# Patient Record
Sex: FEMALE | Race: WHITE | Employment: FULL TIME | URBAN - METROPOLITAN AREA
[De-identification: names, ages, dates, MRNs, and addresses within clinical notes are randomized per-mention and may not be internally consistent; named-entity substitution may affect disease eponyms.]

---

## 2018-03-11 ENCOUNTER — APPOINTMENT (OUTPATIENT)
Dept: GENERAL RADIOLOGY | Age: 28
End: 2018-03-11
Payer: COMMERCIAL

## 2018-03-11 ENCOUNTER — HOSPITAL ENCOUNTER (EMERGENCY)
Age: 28
Discharge: HOME | End: 2018-03-11
Attending: EMERGENCY MEDICINE
Payer: COMMERCIAL

## 2018-03-11 VITALS
HEART RATE: 88 BPM | OXYGEN SATURATION: 99 % | WEIGHT: 243.39 LBS | SYSTOLIC BLOOD PRESSURE: 134 MMHG | BODY MASS INDEX: 43.12 KG/M2 | DIASTOLIC BLOOD PRESSURE: 80 MMHG | TEMPERATURE: 98.8 F | RESPIRATION RATE: 16 BRPM | HEIGHT: 63 IN
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
## 2018-03-11 DIAGNOSIS — S46.912A STRAIN OF LEFT SHOULDER, INITIAL ENCOUNTER: Primary | ICD-10-CM

## 2018-03-11 PROCEDURE — 99283 EMERGENCY DEPT VISIT LOW MDM: CPT

## 2018-03-11 PROCEDURE — 73030 X-RAY EXAM OF SHOULDER: CPT

## 2018-03-11 PROCEDURE — A4565 SLINGS: HCPCS

## 2018-03-11 PROCEDURE — 74011250637 HC RX REV CODE- 250/637: Performed by: PHYSICIAN ASSISTANT

## 2018-03-11 RX ORDER — IBUPROFEN 600 MG/1
600 TABLET ORAL
Status: COMPLETED | OUTPATIENT
Start: 2018-03-11 | End: 2018-03-11

## 2018-03-11 RX ORDER — LORATADINE 10 MG/1
10 TABLET ORAL
COMMUNITY
End: 2021-10-29

## 2018-03-11 RX ORDER — IBUPROFEN 600 MG/1
600 TABLET ORAL
Qty: 20 TAB | Refills: 0 | Status: SHIPPED | OUTPATIENT
Start: 2018-03-11 | End: 2018-03-21

## 2018-03-11 RX ORDER — BUDESONIDE AND FORMOTEROL FUMARATE DIHYDRATE 160; 4.5 UG/1; UG/1
2 AEROSOL RESPIRATORY (INHALATION) 2 TIMES DAILY
COMMUNITY
End: 2021-10-29

## 2018-03-11 RX ADMIN — IBUPROFEN 600 MG: 600 TABLET, FILM COATED ORAL at 17:25

## 2018-03-11 NOTE — LETTER
[address] 
\[facility]\"" EMERGENCY DEPT 
[address] [place] [address]. [address] [id number] [phone] Work/School Note Date: 3/11/2018 To Whom It May concern: 
 
Santiago Ortiz was seen and treated today in the emergency room by the following provider(s): 
Attending Provider: Opal Moreira DO Physician Assistant: ROBERT Zuluaga. Please excuse Santiago Ortiz from work tomorrow. Sincerely, Jose Roberto Bernal, [address]

## 2018-03-11 NOTE — DISCHARGE INSTRUCTIONS
Shoulder Pain: Care Instructions  Your Care Instructions    You can hurt your shoulder by using it too much during an activity, such as fishing or baseball. It can also happen as part of the everyday wear and tear of getting older. Shoulder injuries can be slow to heal, but your shoulder should get better with time. Your doctor may recommend a sling to rest your shoulder. If you have injured your shoulder, you may need testing and treatment. Follow-up care is a key part of your treatment and safety. Be sure to make and go to all appointments, and call your doctor if you are having problems. It's also a good idea to know your test results and keep a list of the medicines you take. How can you care for yourself at home? · Take pain medicines exactly as directed. ¨ If the doctor gave you a prescription medicine for pain, take it as prescribed. ¨ If you are not taking a prescription pain medicine, ask your doctor if you can take an over-the-counter medicine. ¨ Do not take two or more pain medicines at the same time unless the doctor told you to. Many pain medicines contain acetaminophen, which is Tylenol. Too much acetaminophen (Tylenol) can be harmful. · If your doctor recommends that you wear a sling, use it as directed. Do not take it off before your doctor tells you to. · Put ice or a cold pack on the sore area for 10 to 20 minutes at a time. Put a thin cloth between the ice and your skin. · If there is no swelling, you can put moist heat, a heating pad, or a warm cloth on your shoulder. Some doctors suggest alternating between hot and cold. · Rest your shoulder for a few days. If your doctor recommends it, you can then begin gentle exercise of the shoulder, but do not lift anything heavy. When should you call for help? Call 911 anytime you think you may need emergency care. For example, call if:  ? · You have chest pain or pressure. This may occur with:  ¨ Sweating.   ¨ Shortness of breath. ¨ Nausea or vomiting. ¨ Pain that spreads from the chest to the neck, jaw, or one or both shoulders or arms. ¨ Dizziness or lightheadedness. ¨ A fast or uneven pulse. After calling 911, chew 1 adult-strength aspirin. Wait for an ambulance. Do not try to drive yourself. ? · Your arm or hand is cool or pale or changes color. ?Call your doctor now or seek immediate medical care if:  ? · You have signs of infection, such as:  ¨ Increased pain, swelling, warmth, or redness in your shoulder. ¨ Red streaks leading from a place on your shoulder. ¨ Pus draining from an area of your shoulder. ¨ Swollen lymph nodes in your neck, armpits, or groin. ¨ A fever. ? Watch closely for changes in your health, and be sure to contact your doctor if:  ? · You cannot use your shoulder. ? · Your shoulder does not get better as expected. Where can you learn more? Go to http://ha-brent.info/. Enter U992 in the search box to learn more about \"Shoulder Pain: Care Instructions. \"  Current as of: March 21, 2017  Content Version: 11.4  © 4003-7364 [employer]. Care instructions adapted under license by [employer] (which disclaims liability or warranty for this information). If you have questions about a medical condition or this instruction, always ask your healthcare professional. [place] [address] any warranty or liability for your use of this information.

## 2018-03-11 NOTE — ED PROVIDER NOTES
EMERGENCY DEPARTMENT HISTORY AND PHYSICAL EXAM      Date: 3/11/2018  Patient Name: Eric Salazar    History of Presenting Illness     Chief Complaint   Patient presents with    Fall     patient reports tripping and falling down a few stairs    Shoulder Injury     left shoulder pain, radiating down toward elbow       History Provided By: Patient    HPI: Eric Salazar, 33 y.o. female with PMHx significant for asthma, presents ambulatory to the ED with cc of an acute onset of pulling, left shoulder pain radiating down the LUE s/p fall 1 hour PTA. The pt reports associated sx of head trauma at the time of the fall as well. She expresses that she was going down the stairs when she lost her balance causing her to fall down 10 steps falling on the left side of her body noting that she mildly hit her head as well. The pt discloses that her discomfort is exacerbated with any movement of the LUE and denies taking any medications PTA. She ensures that she is right hand dominant and is UTD on all immunizations. The pt denies any h/o left shoulder complications and denies being followed by an orthopedic doctor. She denies any fevers, chills, chest pain, SOB, abdominal pain, LOC or seizure activity. PCP: Eden Vallecillo., MD    There are no other complaints, changes, or physical findings at this time. Current Outpatient Prescriptions   Medication Sig Dispense Refill    cetirizine (ZYRTEC) 10 mg tablet Take 10 mg by mouth daily.  multivitamin (ONE A DAY) tablet Take 1 tablet by mouth daily.  OTHER Birth control pill cannot recall name         Past History     Past Medical History:  Past Medical History:   Diagnosis Date    Asthma        Past Surgical History:  No past surgical history on file. Family History:  No family history on file.     Social History:  Social History   Substance Use Topics    Smoking status: Never Smoker    Smokeless tobacco: Not on file    Alcohol use No Allergies:  No Known Allergies      Review of Systems   Review of Systems   Constitutional: Negative. Negative for chills and fever. HENT: Negative. Negative for rhinorrhea and sore throat. Eyes: Negative. Negative for visual disturbance. Respiratory: Negative. Negative for cough, chest tightness, shortness of breath and wheezing. Cardiovascular: Negative. Negative for chest pain and palpitations. Gastrointestinal: Negative. Negative for abdominal pain, constipation, diarrhea, nausea and vomiting. Genitourinary: Negative. Negative for dysuria and hematuria. Musculoskeletal: Positive for arthralgias (left shoulder and LUE ). Negative for myalgias. Skin: Negative. Negative for rash. Allergic/Immunologic: Negative. Negative for environmental allergies and food allergies. Neurological: Negative. Negative for seizures and headaches. (+) head trauma   (-) LOC   Psychiatric/Behavioral: Negative. Negative for suicidal ideas. All other systems reviewed and are negative. Physical Exam   Physical Exam   Constitutional: She is oriented to person, place, and time. She appears well-developed and well-nourished. No distress. Pleasant, overweight 34 yo  female   HENT:   Head: Normocephalic and atraumatic. Eyes: Conjunctivae are normal. Right eye exhibits no discharge. Left eye exhibits no discharge. Neck: Normal range of motion. Neck supple. Cardiovascular: Normal rate, regular rhythm, normal heart sounds and intact distal pulses. No murmur heard. Pulmonary/Chest: Effort normal and breath sounds normal. No respiratory distress. She has no wheezes. Musculoskeletal:   L SHOULDER: No swelling, ecchymosis or deformity. Slight TTP to the anterior shoulder; otherwise NT to palpation. ROM of the shoulder limited with pain. Distal NV intact. Cap refill < 2 sec. Ambulatory. Neurological: She is alert and oriented to person, place, and time.    Skin: Skin is warm and dry. She is not diaphoretic. Psychiatric: She has a normal mood and affect. Her behavior is normal.   Nursing note and vitals reviewed. Diagnostic Study Results     Radiologic Studies -   XR SHOULDER LT AP/LAT MIN 2 V   Final Result   EXAM:  XR SHOULDER LT AP/LAT MIN 2 V     INDICATION:   Trauma.     COMPARISON: None.     FINDINGS: Three views of the left shoulder demonstrate no fracture, dislocation  or other acute abnormality.     IMPRESSION  IMPRESSION:  No acute abnormality. Medical Decision Making   I am the first provider for this patient. I reviewed the vital signs, available nursing notes, past medical history, past surgical history, family history and social history. Vital Signs-Reviewed the patient's vital signs. Patient Vitals for the past 12 hrs:   Temp Pulse Resp BP SpO2   03/11/18 1704 98.8 °F (37.1 °C) 88 16 134/80 99 %       Pulse Oximetry Analysis - 99% on room air    Cardiac Monitor:   Rate: 88 bpm  Rhythm: Normal Sinus Rhythm        Records Reviewed: Nursing Notes, Old Medical Records, Previous Radiology Studies and Previous Laboratory Studies    Provider Notes (Medical Decision Making):     DDx: Strain, Sprain, Fracture, Rotator cuff injury. ED Course:   Initial assessment performed. The patients presenting problems have been discussed, and they are in agreement with the care plan formulated and outlined with them. I have encouraged them to ask questions as they arise throughout their visit. Progress Notes:    5:57 PM  The pt has been re-evaluated. She was updated on reassuring XR results. She was updated on the plan for discharge with sling placement and orthopedic follow up if needed. Procedure Note - Immobilizer Placement:  5:58 PM  Performed by: Willie Stewart   Neurovascularly intact prior to tx. A shoulder immobilizer was placed on pt's left shoulder. Joint was placed in neutral position. Neurovascularly intact after tx.    The procedure took 1-15 minutes, and pt tolerated well. Written by Arnulfo Subramanian ED Scribe, as dictated by RONI [id number] [facility] Time: 0 minutes    Disposition:  Discharge Note:  6:01 PM  The patient is ready for discharge. The patient's signs, symptoms, diagnosis, and discharge instruction have been discussed and the patient has conveyed their understanding. The patient is to follow up as recommended or return to the ER should their symptoms worsen. Plan has been discussed and the patient is in agreement. Written by Koki Mcdowell ED Scribe, as dictated by RONI Avendano     PLAN:  1. Current Discharge Medication List      START taking these medications    Details   ibuprofen (MOTRIN) 600 mg tablet Take 1 Tab by mouth every eight (8) hours as needed for Pain for up to 10 days. Qty: 20 Tab, Refills: 0         CONTINUE these medications which have NOT CHANGED    Details   loratadine (CLARITIN) 10 mg tablet Take 10 mg by mouth.      budesonide-formoterol (SYMBICORT) 160-4.5 mcg/actuation HFAA Take 2 Puffs by inhalation two (2) times a day. OTHER Birth control pill cannot recall name      multivitamin (ONE A DAY) tablet Take 1 tablet by mouth daily. 2.   Follow-up Information     Follow up With Details Comments Ingrid Heard MD In 1 week As needed [address]  [phone]        3. Use sling as discussed  Return to ED if worse     Diagnosis     Clinical Impression:   1. Strain of left shoulder, initial encounter    2. Fall, initial encounter        Attestations:    Attestation: This note is prepared by Marta Mcdowell, acting as Scribe for [employer] .      RONI Rodriguez : The scribe's documentation has been prepared under my direction and personally reviewed by me in its entirety.  I confirm that the note above accurately reflects all work, treatment, procedures, and medical decision making performed by me.

## 2018-03-11 NOTE — ED NOTES
Galen JONES has reviewed discharge instructions with the patient. The patient verbalized understanding. Pt discharged with written instructions. No further concerns at this time.

## 2021-05-04 LAB
ANTIBODY SCREEN, EXTERNAL: NORMAL
CHLAMYDIA, EXTERNAL: NORMAL
HBSAG, EXTERNAL: NORMAL
HIV, EXTERNAL: NON REACTIVE
N. GONORRHEA, EXTERNAL: NORMAL
RUBELLA, EXTERNAL: NORMAL
T. PALLIDUM, EXTERNAL: NON REACTIVE

## 2021-10-25 ENCOUNTER — HOSPITAL ENCOUNTER (INPATIENT)
Age: 31
LOS: 4 days | Discharge: HOME | End: 2021-10-29
Attending: OBSTETRICS & GYNECOLOGY | Admitting: OBSTETRICS & GYNECOLOGY
Payer: COMMERCIAL

## 2021-10-25 PROBLEM — O42.90 PROM (PREMATURE RUPTURE OF MEMBRANES): Status: ACTIVE | Noted: 2021-10-25

## 2021-10-25 LAB
A1 MICROGLOB PLACENTAL VAG QL: POSITIVE
ABO + RH BLD: NORMAL
ALBUMIN SERPL-MCNC: 2.5 G/DL (ref 3.5–5)
ALBUMIN SERPL-MCNC: 2.7 G/DL (ref 3.5–5)
ALBUMIN/GLOB SERPL: 0.6 {RATIO} (ref 1.1–2.2)
ALBUMIN/GLOB SERPL: 0.7 {RATIO} (ref 1.1–2.2)
ALP SERPL-CCNC: 174 U/L (ref 45–117)
ALP SERPL-CCNC: 174 U/L (ref 45–117)
ALT SERPL-CCNC: 104 U/L (ref 12–78)
ALT SERPL-CCNC: 94 U/L (ref 12–78)
AMPHET UR QL SCN: NEGATIVE
ANION GAP SERPL CALC-SCNC: 10 MMOL/L (ref 5–15)
ANION GAP SERPL CALC-SCNC: 9 MMOL/L (ref 5–15)
AST SERPL-CCNC: 56 U/L (ref 15–37)
AST SERPL-CCNC: 64 U/L (ref 15–37)
BARBITURATES UR QL SCN: NEGATIVE
BASOPHILS # BLD: 0 K/UL (ref 0–0.1)
BASOPHILS # BLD: 0 K/UL (ref 0–0.1)
BASOPHILS NFR BLD: 0 % (ref 0–1)
BASOPHILS NFR BLD: 0 % (ref 0–1)
BENZODIAZ UR QL: NEGATIVE
BILIRUB SERPL-MCNC: 0.3 MG/DL (ref 0.2–1)
BILIRUB SERPL-MCNC: 0.3 MG/DL (ref 0.2–1)
BLOOD GROUP ANTIBODIES SERPL: NORMAL
BUN SERPL-MCNC: 4 MG/DL (ref 6–20)
BUN SERPL-MCNC: 5 MG/DL (ref 6–20)
BUN/CREAT SERPL: 11 (ref 12–20)
BUN/CREAT SERPL: 6 (ref 12–20)
CALCIUM SERPL-MCNC: 9.2 MG/DL (ref 8.5–10.1)
CALCIUM SERPL-MCNC: 9.2 MG/DL (ref 8.5–10.1)
CANNABINOIDS UR QL SCN: POSITIVE
CHLORIDE SERPL-SCNC: 104 MMOL/L (ref 97–108)
CHLORIDE SERPL-SCNC: 106 MMOL/L (ref 97–108)
CO2 SERPL-SCNC: 19 MMOL/L (ref 21–32)
CO2 SERPL-SCNC: 20 MMOL/L (ref 21–32)
COCAINE UR QL SCN: NEGATIVE
CONTROL LINE PRESENT?: NORMAL
COVID-19 RAPID TEST, COVR: NOT DETECTED
CREAT SERPL-MCNC: 0.47 MG/DL (ref 0.55–1.02)
CREAT SERPL-MCNC: 0.65 MG/DL (ref 0.55–1.02)
CREAT UR-MCNC: 123 MG/DL
DIFFERENTIAL METHOD BLD: ABNORMAL
DIFFERENTIAL METHOD BLD: ABNORMAL
DRUG SCRN COMMENT,DRGCM: ABNORMAL
EOSINOPHIL # BLD: 0 K/UL (ref 0–0.4)
EOSINOPHIL # BLD: 0.2 K/UL (ref 0–0.4)
EOSINOPHIL NFR BLD: 0 % (ref 0–7)
EOSINOPHIL NFR BLD: 3 % (ref 0–7)
ERYTHROCYTE [DISTWIDTH] IN BLOOD BY AUTOMATED COUNT: 14.4 % (ref 11.5–14.5)
ERYTHROCYTE [DISTWIDTH] IN BLOOD BY AUTOMATED COUNT: 14.4 % (ref 11.5–14.5)
EXPIRATION DATE: NORMAL
GLOBULIN SER CALC-MCNC: 4 G/DL (ref 2–4)
GLOBULIN SER CALC-MCNC: 4.1 G/DL (ref 2–4)
GLUCOSE BLD STRIP.AUTO-MCNC: 122 MG/DL (ref 65–117)
GLUCOSE BLD STRIP.AUTO-MCNC: 122 MG/DL (ref 65–117)
GLUCOSE BLD STRIP.AUTO-MCNC: 154 MG/DL (ref 65–117)
GLUCOSE SERPL-MCNC: 158 MG/DL (ref 65–100)
GLUCOSE SERPL-MCNC: 87 MG/DL (ref 65–100)
HCT VFR BLD AUTO: 33.4 % (ref 35–47)
HCT VFR BLD AUTO: 33.8 % (ref 35–47)
HGB BLD-MCNC: 11.3 G/DL (ref 11.5–16)
HGB BLD-MCNC: 11.6 G/DL (ref 11.5–16)
IMM GRANULOCYTES # BLD AUTO: 0 K/UL (ref 0–0.04)
IMM GRANULOCYTES # BLD AUTO: 0.1 K/UL (ref 0–0.04)
IMM GRANULOCYTES NFR BLD AUTO: 1 % (ref 0–0.5)
IMM GRANULOCYTES NFR BLD AUTO: 1 % (ref 0–0.5)
INTERNAL NEGATIVE CONTROL: NORMAL
KIT LOT NO.: NORMAL
LDH SERPL L TO P-CCNC: 223 U/L (ref 81–246)
LYMPHOCYTES # BLD: 1.3 K/UL (ref 0.8–3.5)
LYMPHOCYTES # BLD: 1.8 K/UL (ref 0.8–3.5)
LYMPHOCYTES NFR BLD: 11 % (ref 12–49)
LYMPHOCYTES NFR BLD: 20 % (ref 12–49)
MCH RBC QN AUTO: 29.6 PG (ref 26–34)
MCH RBC QN AUTO: 30.3 PG (ref 26–34)
MCHC RBC AUTO-ENTMCNC: 33.8 G/DL (ref 30–36.5)
MCHC RBC AUTO-ENTMCNC: 34.3 G/DL (ref 30–36.5)
MCV RBC AUTO: 87.4 FL (ref 80–99)
MCV RBC AUTO: 88.3 FL (ref 80–99)
METHADONE UR QL: NEGATIVE
MONOCYTES # BLD: 0.2 K/UL (ref 0–1)
MONOCYTES # BLD: 0.5 K/UL (ref 0–1)
MONOCYTES NFR BLD: 2 % (ref 5–13)
MONOCYTES NFR BLD: 5 % (ref 5–13)
NEUTS SEG # BLD: 10.1 K/UL (ref 1.8–8)
NEUTS SEG # BLD: 6.3 K/UL (ref 1.8–8)
NEUTS SEG NFR BLD: 71 % (ref 32–75)
NEUTS SEG NFR BLD: 86 % (ref 32–75)
NRBC # BLD: 0 K/UL (ref 0–0.01)
NRBC # BLD: 0 K/UL (ref 0–0.01)
NRBC BLD-RTO: 0 PER 100 WBC
NRBC BLD-RTO: 0 PER 100 WBC
OPIATES UR QL: NEGATIVE
PCP UR QL: NEGATIVE
PLATELET # BLD AUTO: 174 K/UL (ref 150–400)
PLATELET # BLD AUTO: 179 K/UL (ref 150–400)
PMV BLD AUTO: 12.3 FL (ref 8.9–12.9)
PMV BLD AUTO: 12.5 FL (ref 8.9–12.9)
POTASSIUM SERPL-SCNC: 3.5 MMOL/L (ref 3.5–5.1)
POTASSIUM SERPL-SCNC: 3.7 MMOL/L (ref 3.5–5.1)
PROT SERPL-MCNC: 6.5 G/DL (ref 6.4–8.2)
PROT SERPL-MCNC: 6.8 G/DL (ref 6.4–8.2)
PROT UR-MCNC: 40 MG/DL (ref 0–11.9)
PROT/CREAT UR-RTO: 0.3
RBC # BLD AUTO: 3.82 M/UL (ref 3.8–5.2)
RBC # BLD AUTO: 3.83 M/UL (ref 3.8–5.2)
SERVICE CMNT-IMP: ABNORMAL
SODIUM SERPL-SCNC: 133 MMOL/L (ref 136–145)
SODIUM SERPL-SCNC: 135 MMOL/L (ref 136–145)
SOURCE, COVRS: NORMAL
SPECIMEN EXP DATE BLD: NORMAL
URATE SERPL-MCNC: 4.9 MG/DL (ref 2.6–6)
WBC # BLD AUTO: 11.7 K/UL (ref 3.6–11)
WBC # BLD AUTO: 8.8 K/UL (ref 3.6–11)

## 2021-10-25 PROCEDURE — 84156 ASSAY OF PROTEIN URINE: CPT

## 2021-10-25 PROCEDURE — 74011250637 HC RX REV CODE- 250/637: Performed by: OBSTETRICS & GYNECOLOGY

## 2021-10-25 PROCEDURE — 94640 AIRWAY INHALATION TREATMENT: CPT

## 2021-10-25 PROCEDURE — 36415 COLL VENOUS BLD VENIPUNCTURE: CPT

## 2021-10-25 PROCEDURE — 85025 COMPLETE CBC W/AUTO DIFF WBC: CPT

## 2021-10-25 PROCEDURE — 74011000250 HC RX REV CODE- 250: Performed by: OBSTETRICS & GYNECOLOGY

## 2021-10-25 PROCEDURE — 80307 DRUG TEST PRSMV CHEM ANLYZR: CPT

## 2021-10-25 PROCEDURE — 87081 CULTURE SCREEN ONLY: CPT

## 2021-10-25 PROCEDURE — 59200 INSERT CERVICAL DILATOR: CPT

## 2021-10-25 PROCEDURE — 75410000002 HC LABOR FEE PER 1 HR

## 2021-10-25 PROCEDURE — 65410000002 HC RM PRIVATE OB

## 2021-10-25 PROCEDURE — 84550 ASSAY OF BLOOD/URIC ACID: CPT

## 2021-10-25 PROCEDURE — 74011000258 HC RX REV CODE- 258

## 2021-10-25 PROCEDURE — 82962 GLUCOSE BLOOD TEST: CPT

## 2021-10-25 PROCEDURE — 83615 LACTATE (LD) (LDH) ENZYME: CPT

## 2021-10-25 PROCEDURE — 84112 EVAL AMNIOTIC FLUID PROTEIN: CPT | Performed by: OBSTETRICS & GYNECOLOGY

## 2021-10-25 PROCEDURE — 80053 COMPREHEN METABOLIC PANEL: CPT

## 2021-10-25 PROCEDURE — 74011250636 HC RX REV CODE- 250/636: Performed by: OBSTETRICS & GYNECOLOGY

## 2021-10-25 PROCEDURE — 86901 BLOOD TYPING SEROLOGIC RH(D): CPT

## 2021-10-25 PROCEDURE — 87635 SARS-COV-2 COVID-19 AMP PRB: CPT

## 2021-10-25 PROCEDURE — 74011636637 HC RX REV CODE- 636/637: Performed by: OBSTETRICS & GYNECOLOGY

## 2021-10-25 PROCEDURE — 74011000258 HC RX REV CODE- 258: Performed by: OBSTETRICS & GYNECOLOGY

## 2021-10-25 PROCEDURE — 74011250636 HC RX REV CODE- 250/636

## 2021-10-25 PROCEDURE — 96372 THER/PROPH/DIAG INJ SC/IM: CPT

## 2021-10-25 RX ORDER — LABETALOL HYDROCHLORIDE 5 MG/ML
20 INJECTION, SOLUTION INTRAVENOUS ONCE
Status: COMPLETED | OUTPATIENT
Start: 2021-10-25 | End: 2021-10-25

## 2021-10-25 RX ORDER — DIPHENHYDRAMINE HYDROCHLORIDE 50 MG/ML
25 INJECTION, SOLUTION INTRAMUSCULAR; INTRAVENOUS
Status: DISCONTINUED | OUTPATIENT
Start: 2021-10-25 | End: 2021-10-27

## 2021-10-25 RX ORDER — SODIUM CHLORIDE 0.9 % (FLUSH) 0.9 %
5-40 SYRINGE (ML) INJECTION AS NEEDED
Status: DISCONTINUED | OUTPATIENT
Start: 2021-10-25 | End: 2021-10-29 | Stop reason: HOSPADM

## 2021-10-25 RX ORDER — INSULIN LISPRO 100 [IU]/ML
INJECTION, SOLUTION INTRAVENOUS; SUBCUTANEOUS EVERY 4 HOURS
Status: DISCONTINUED | OUTPATIENT
Start: 2021-10-25 | End: 2021-10-26

## 2021-10-25 RX ORDER — SODIUM CHLORIDE, SODIUM LACTATE, POTASSIUM CHLORIDE, CALCIUM CHLORIDE 600; 310; 30; 20 MG/100ML; MG/100ML; MG/100ML; MG/100ML
75 INJECTION, SOLUTION INTRAVENOUS CONTINUOUS
Status: DISCONTINUED | OUTPATIENT
Start: 2021-10-25 | End: 2021-10-28 | Stop reason: ALTCHOICE

## 2021-10-25 RX ORDER — ARFORMOTEROL TARTRATE 15 UG/2ML
15 SOLUTION RESPIRATORY (INHALATION)
Status: DISCONTINUED | OUTPATIENT
Start: 2021-10-25 | End: 2021-10-29 | Stop reason: HOSPADM

## 2021-10-25 RX ORDER — DOCUSATE SODIUM 100 MG/1
100 CAPSULE, LIQUID FILLED ORAL
Status: DISCONTINUED | OUTPATIENT
Start: 2021-10-25 | End: 2021-10-29 | Stop reason: HOSPADM

## 2021-10-25 RX ORDER — CALCIUM GLUCONATE 94 MG/ML
INJECTION, SOLUTION INTRAVENOUS
Status: DISPENSED
Start: 2021-10-25 | End: 2021-10-26

## 2021-10-25 RX ORDER — BUDESONIDE 0.5 MG/2ML
500 INHALANT ORAL
Status: DISCONTINUED | OUTPATIENT
Start: 2021-10-25 | End: 2021-10-29 | Stop reason: HOSPADM

## 2021-10-25 RX ORDER — SERTRALINE HYDROCHLORIDE 50 MG/1
150 TABLET, FILM COATED ORAL EVERY EVENING
Status: DISCONTINUED | OUTPATIENT
Start: 2021-10-25 | End: 2021-10-29 | Stop reason: HOSPADM

## 2021-10-25 RX ORDER — MAGNESIUM SULFATE HEPTAHYDRATE 40 MG/ML
INJECTION, SOLUTION INTRAVENOUS
Status: COMPLETED
Start: 2021-10-25 | End: 2021-10-25

## 2021-10-25 RX ORDER — MAGNESIUM SULFATE HEPTAHYDRATE 40 MG/ML
4 INJECTION, SOLUTION INTRAVENOUS ONCE
Status: COMPLETED | OUTPATIENT
Start: 2021-10-25 | End: 2021-10-25

## 2021-10-25 RX ORDER — BETAMETHASONE SODIUM PHOSPHATE AND BETAMETHASONE ACETATE 3; 3 MG/ML; MG/ML
12 INJECTION, SUSPENSION INTRA-ARTICULAR; INTRALESIONAL; INTRAMUSCULAR; SOFT TISSUE EVERY 24 HOURS
Status: DISCONTINUED | OUTPATIENT
Start: 2021-10-25 | End: 2021-10-27

## 2021-10-25 RX ORDER — SODIUM CHLORIDE 0.9 % (FLUSH) 0.9 %
5-40 SYRINGE (ML) INJECTION EVERY 8 HOURS
Status: DISCONTINUED | OUTPATIENT
Start: 2021-10-25 | End: 2021-10-28 | Stop reason: ALTCHOICE

## 2021-10-25 RX ORDER — MAGNESIUM SULFATE 100 %
4 CRYSTALS MISCELLANEOUS AS NEEDED
Status: DISCONTINUED | OUTPATIENT
Start: 2021-10-25 | End: 2021-10-29 | Stop reason: HOSPADM

## 2021-10-25 RX ORDER — DEXTROSE 50 % IN WATER (D50W) INTRAVENOUS SYRINGE
12.5-25 AS NEEDED
Status: DISCONTINUED | OUTPATIENT
Start: 2021-10-25 | End: 2021-10-29 | Stop reason: HOSPADM

## 2021-10-25 RX ORDER — ALBUTEROL SULFATE 0.83 MG/ML
SOLUTION RESPIRATORY (INHALATION)
COMMUNITY

## 2021-10-25 RX ORDER — BUTORPHANOL TARTRATE 2 MG/ML
1 INJECTION INTRAMUSCULAR; INTRAVENOUS
Status: DISCONTINUED | OUTPATIENT
Start: 2021-10-25 | End: 2021-10-27

## 2021-10-25 RX ORDER — INSULIN LISPRO 100 [IU]/ML
INJECTION, SOLUTION INTRAVENOUS; SUBCUTANEOUS
Status: DISCONTINUED | OUTPATIENT
Start: 2021-10-25 | End: 2021-10-25

## 2021-10-25 RX ORDER — SERTRALINE HYDROCHLORIDE 100 MG/1
150 TABLET, FILM COATED ORAL DAILY
COMMUNITY

## 2021-10-25 RX ORDER — FAMOTIDINE 20 MG/1
20 TABLET, FILM COATED ORAL
Status: DISCONTINUED | OUTPATIENT
Start: 2021-10-25 | End: 2021-10-29 | Stop reason: HOSPADM

## 2021-10-25 RX ORDER — LABETALOL HYDROCHLORIDE 5 MG/ML
INJECTION, SOLUTION INTRAVENOUS
Status: COMPLETED
Start: 2021-10-25 | End: 2021-10-26

## 2021-10-25 RX ORDER — ONDANSETRON 2 MG/ML
4 INJECTION INTRAMUSCULAR; INTRAVENOUS
Status: DISCONTINUED | OUTPATIENT
Start: 2021-10-25 | End: 2021-10-29 | Stop reason: HOSPADM

## 2021-10-25 RX ORDER — ZOLPIDEM TARTRATE 5 MG/1
5 TABLET ORAL
Status: DISCONTINUED | OUTPATIENT
Start: 2021-10-25 | End: 2021-10-27

## 2021-10-25 RX ADMIN — SODIUM CHLORIDE 5 MILLION UNITS: 900 INJECTION INTRAVENOUS at 16:06

## 2021-10-25 RX ADMIN — MISOPROSTOL 25 MCG: 100 TABLET ORAL at 20:01

## 2021-10-25 RX ADMIN — MISOPROSTOL 25 MCG: 100 TABLET ORAL at 16:00

## 2021-10-25 RX ADMIN — SODIUM CHLORIDE, POTASSIUM CHLORIDE, SODIUM LACTATE AND CALCIUM CHLORIDE 75 ML/HR: 600; 310; 30; 20 INJECTION, SOLUTION INTRAVENOUS at 14:47

## 2021-10-25 RX ADMIN — MAGNESIUM SULFATE HEPTAHYDRATE 2 G/HR: 500 INJECTION, SOLUTION INTRAMUSCULAR; INTRAVENOUS at 15:14

## 2021-10-25 RX ADMIN — MAGNESIUM SULFATE HEPTAHYDRATE 2 G/HR: 500 INJECTION, SOLUTION INTRAMUSCULAR; INTRAVENOUS at 19:53

## 2021-10-25 RX ADMIN — MAGNESIUM SULFATE HEPTAHYDRATE 4 G: 40 INJECTION, SOLUTION INTRAVENOUS at 14:47

## 2021-10-25 RX ADMIN — BETAMETHASONE SODIUM PHOSPHATE AND BETAMETHASONE ACETATE 12 MG: 3; 3 INJECTION, SUSPENSION INTRA-ARTICULAR; INTRALESIONAL; INTRAMUSCULAR at 13:05

## 2021-10-25 RX ADMIN — SODIUM CHLORIDE 2.5 MILLION UNITS: 900 INJECTION, SOLUTION INTRAVENOUS at 20:05

## 2021-10-25 RX ADMIN — INSULIN LISPRO 3 UNITS: 100 INJECTION, SOLUTION INTRAVENOUS; SUBCUTANEOUS at 20:16

## 2021-10-25 RX ADMIN — SERTRALINE 150 MG: 50 TABLET, FILM COATED ORAL at 20:18

## 2021-10-25 RX ADMIN — BUDESONIDE 500 MCG: 0.5 INHALANT RESPIRATORY (INHALATION) at 21:22

## 2021-10-25 RX ADMIN — LABETALOL HYDROCHLORIDE 20 MG: 5 INJECTION INTRAVENOUS at 19:06

## 2021-10-25 RX ADMIN — ARFORMOTEROL TARTRATE 15 MCG: 15 SOLUTION RESPIRATORY (INHALATION) at 21:22

## 2021-10-25 NOTE — H&P
History & Physical    Name: Alyssa Fischer MRN: [id number]  SSN: [id number]    YOB: 1990  Age: 28 y.o. Sex: female      Subjective:     Reason for Admission:  Pregnancy and PPROM    History of Present Illness: Ms. Ana Maria Jovel is a 28 y.o. G1 @ 34w2d who presents with PPROM from office. Pregnancy c/b:  - gestational diabetes mellitus - primigravida  - NIPT, normal  - coronavirus infection - + test, 5/10/2021 Baby asa Growth 28w___36w___  - asthma - symbicort, rare albuterol use  - rubella non-immune - vaccinate PP  - body mass index 40+ - severely obese - pre-pregnancy BMI: 42, a1c__5.4%_ FS/echo w/MFM nl, growth q 4w, weekly surv 34w__  - anxiety - Zoloft 50 mg    OB History    Para Term  AB Living   1             SAB TAB Ectopic Molar Multiple Live Births                    # Outcome Date GA Lbr Juan Carlos/2nd Weight Sex Delivery Anes PTL Lv   1 Current              Past Medical History:   Diagnosis Date    Asthma      No past surgical history on file. Social History     Occupational History    Not on file   Tobacco Use    Smoking status: Never Smoker   Substance and Sexual Activity    Alcohol use: No    Drug use: Not on file    Sexual activity: Not on file      No family history on file. No Known Allergies  Prior to Admission medications    Medication Sig Start Date End Date Taking? Authorizing Provider   loratadine (CLARITIN) 10 mg tablet Take 10 mg by mouth. Chela Nunez MD   budesonide-formoterol (SYMBICORT) 160-4.5 mcg/actuation HFAA Take 2 Puffs by inhalation two (2) times a day. Chela Nunez MD   multivitamin (ONE A DAY) tablet Take 1 tablet by mouth daily. Chela Nunez MD   OTHER Birth control pill cannot recall name    Chela Nunez MD        Review of Systems:  A comprehensive review of systems was negative except for that written in the History of Present Illness.      Objective:     Vitals:    Visit Vitals  /79   Pulse (!) 105   Temp 99 °F (37.2 °C)   Resp 20 SpO2 96%       Physical Exam:  Patient without distress. Abdomen: soft, nontender  Fundus: soft and non tender  Perineum: blood absent, amniotic fluid present  Cervical Exam: visually closed, closed/long in office     Membranes:  Premature Rupture of Membranes; Amniotic Fluid: clear fluid  Uterine Activity:  None  Fetal Heart Rate:  130, moderate variability, + accles, cat I       Lab/Data Review:  No results found for this or any previous visit (from the past 24 hour(s)). Admission on 10/25/2021   Component Date Value Ref Range Status    Crossmatch Expiration 10/25/2021 10/28/2021,2359   Final    ABO/Rh(D) 10/25/2021 A POSITIVE   Final    Antibody screen 10/25/2021 NEG   Final    WBC 10/25/2021 8.8  3.6 - 11.0 K/uL Final    RBC 10/25/2021 3.82  3.80 - 5.20 M/uL Final    HGB 10/25/2021 11.3* 11.5 - 16.0 g/dL Final    HCT 10/25/2021 33.4* 35.0 - 47.0 % Final    MCV 10/25/2021 87.4  80.0 - 99.0 FL Final    MCH 10/25/2021 29.6  26.0 - 34.0 PG Final    MCHC 10/25/2021 33.8  30.0 - 36.5 g/dL Final    RDW 10/25/2021 14.4  11.5 - 14.5 % Final    PLATELET 95/89/3035 347  150 - 400 K/uL Final    MPV 10/25/2021 12.3  8.9 - 12.9 FL Final    NRBC 10/25/2021 0.0  0  WBC Final    ABSOLUTE NRBC 10/25/2021 0.00  0.00 - 0.01 K/uL Final    NEUTROPHILS 10/25/2021 71  32 - 75 % Final    LYMPHOCYTES 10/25/2021 20  12 - 49 % Final    MONOCYTES 10/25/2021 5  5 - 13 % Final    EOSINOPHILS 10/25/2021 3  0 - 7 % Final    BASOPHILS 10/25/2021 0  0 - 1 % Final    IMMATURE GRANULOCYTES 10/25/2021 1* 0.0 - 0.5 % Final    ABS. NEUTROPHILS 10/25/2021 6.3  1.8 - 8.0 K/UL Final    ABS. LYMPHOCYTES 10/25/2021 1.8  0.8 - 3.5 K/UL Final    ABS. MONOCYTES 10/25/2021 0.5  0.0 - 1.0 K/UL Final    ABS. EOSINOPHILS 10/25/2021 0.2  0.0 - 0.4 K/UL Final    ABS. BASOPHILS 10/25/2021 0.0  0.0 - 0.1 K/UL Final    ABS. IMM. GRANS.  10/25/2021 0.0  0.00 - 0.04 K/UL Final    DF 10/25/2021 AUTOMATED    Final    AMPHETAMINES 10/25/2021 Negative  NEG   Final    BARBITURATES 10/25/2021 Negative  NEG   Final    BENZODIAZEPINES 10/25/2021 Negative  NEG   Final    COCAINE 10/25/2021 Negative  NEG   Final    METHADONE 10/25/2021 Negative  NEG   Final    OPIATES 10/25/2021 Negative  NEG   Final    PCP(PHENCYCLIDINE) 10/25/2021 Negative  NEG   Final    THC (TH-CANNABINOL) 10/25/2021 Positive* NEG   Final    Drug screen comment 10/25/2021 (NOTE)   Final    Comment: This test is a screen for drugs of abuse in a medical setting only   (i.e., they are unconfirmed results and as such must not be used for   non-medical purposes e.g., employment testing, legal testing). Due to   its inherent nature, false positive (FP) and false negative (FN)   results may be obtained. Therefore, if necessary for medical care,   recommend confirmation of positive findings by GC/MS. The cutoff   values (i.e., the level at which this screening test becomes positive   for a given drug group) are:    Amphetamine/Methamphetamine: 300 ng/mL  Barbiturates:                200 ng/mL  Benzodiazepines:             200 ng/mL  Cocaine:                     150 ng/mL  Methadone:                   300 ng/mL  Opiates:                     300 ng/mL   Phencyclidine, PCP:           25 ng/mL  Marijuana, THC:               50 ng/mL    This screening test can identify the presence of the following drugs   when above the cutoff value; see list posted on the intranet. It can   be viewed by diallo                           ecting in sequence the following from the [address] home   page: [place]; [address] [place] , Resources; [facility], Physician Resources Q to Z; \"UDS (Urine Drug Screen   Automated) List of Detectable Drugs. \"     Or use web address:   [email or web address]  UDS%20List%20of%20Detectable%20Drugs. pdf      Sodium 10/25/2021 135* 136 - 145 mmol/L Final    Potassium 10/25/2021 3.5  3.5 - 5.1 mmol/L Final  Chloride 10/25/2021 106  97 - 108 mmol/L Final    CO2 10/25/2021 20* 21 - 32 mmol/L Final    Anion gap 10/25/2021 9  5 - 15 mmol/L Final    Glucose 10/25/2021 87  65 - 100 mg/dL Final    BUN 10/25/2021 5* 6 - 20 MG/DL Final    Creatinine 10/25/2021 0.47* 0.55 - 1.02 MG/DL Final    BUN/Creatinine ratio 10/25/2021 11* 12 - 20   Final    GFR est AA 10/25/2021 >60  >60 ml/min/1.73m2 Final    GFR est non-AA 10/25/2021 >60  >60 ml/min/1.73m2 Final    Estimated GFR is calculated using the IDMS-traceable Modification of Diet in Renal Disease (MDRD) Study equation, reported for both  Americans (GFRAA) and non- Americans (GFRNA), and normalized to 1.73m2 body surface area. The physician must decide which value applies to the patient.  Calcium 10/25/2021 9.2  8.5 - 10.1 MG/DL Final    Bilirubin, total 10/25/2021 0.3  0.2 - 1.0 MG/DL Final    ALT (SGPT) 10/25/2021 94* 12 - 78 U/L Final    AST (SGOT) 10/25/2021 56* 15 - 37 U/L Final    Alk. phosphatase 10/25/2021 174* 45 - 117 U/L Final    Protein, total 10/25/2021 6.5  6.4 - 8.2 g/dL Final    Albumin 10/25/2021 2.5* 3.5 - 5.0 g/dL Final    Globulin 10/25/2021 4.0  2.0 - 4.0 g/dL Final    A-G Ratio 10/25/2021 0.6* 1.1 - 2.2   Final    Protein, urine random 10/25/2021 40* 0.0 - 11.9 mg/dL Final    Creatinine, urine 10/25/2021 123.00  mg/dL Final    No reference range has been established.  Protein/Creat. urine Ratio 10/25/2021 0.3    Final    Specimen source 10/25/2021 Nasopharyngeal    Final    COVID-19 rapid test 10/25/2021 Not detected  NOTD   Final    Comment: Rapid Abbott ID Now       Rapid NAAT:  The specimen is NEGATIVE for SARS-CoV-2, the novel coronavirus associated with COVID-19. Negative results should be treated as presumptive and, if inconsistent with clinical signs and symptoms or necessary for patient management, should be tested with an alternative molecular assay.   Negative results do not preclude SARS-CoV-2 infection and should not be used as the sole basis for patient management decisions. This test has been authorized by the FDA under an Emergency Use Authorization (EUA) for use by authorized laboratories. Fact sheet for Healthcare Providers: ConventionUpdate.co.nz  Fact sheet for Patients: ConventionUpdate.co.nz       Methodology: Isothermal Nucleic Acid Amplification           Assessment and Plan:      [age] B[address] Duckworth yo G1 @ 34w2d with PPROM, preeclampsia with severe features  - PPROM - reconfirmed here with +nitrazine, amnisure. Reassuring fetal status with bpp 8/8. Vtx. Reviewed initially option of continued expectant management vs IOL between 34-37 weeks, however, will need to proceed with delivery due to preeclampsia with severe features also diagnosed on admission.   - preeclampsia with severe features - bps 332X systolic on repeat measurements. Pr/cr 0.3 on cath sample. LFTs elevated at 94/56. nml cr 0.47, plts 174. Currently LFTs not 2x normal, however, concern for possible developing HELLP syndrome. Start Mg for seizure prophylaxis. Bps thus far have spontaneously improved from severe range, but will treat any persistent severe range with IV antihypertensives. Repeat labs 6 hours after initial and in am.   - prematurity - bmz #1 given 10/25 1300, give 2nd dose tomorrow  - IOL - sve closed. Will ripen with buccal miso-->pitocin.   - gdma1 - however, fastings have been elevated 110s-140s. Will cover with SSI q 4 hours, then q 1-2 hours in active labor.  Goal .    - anxiety - continue zoloft  - RNI  - MMR pp  - FSR/gbs unknown - pcn

## 2021-10-26 ENCOUNTER — ANESTHESIA EVENT (OUTPATIENT)
Dept: LABOR AND DELIVERY | Age: 31
End: 2021-10-26
Payer: COMMERCIAL

## 2021-10-26 ENCOUNTER — ANESTHESIA (OUTPATIENT)
Dept: LABOR AND DELIVERY | Age: 31
End: 2021-10-26
Payer: COMMERCIAL

## 2021-10-26 LAB
ALBUMIN SERPL-MCNC: 2.5 G/DL (ref 3.5–5)
ALBUMIN/GLOB SERPL: 0.6 {RATIO} (ref 1.1–2.2)
ALBUMIN/GLOB SERPL: 0.6 {RATIO} (ref 1.1–2.2)
ALBUMIN/GLOB SERPL: 0.7 {RATIO} (ref 1.1–2.2)
ALP SERPL-CCNC: 163 U/L (ref 45–117)
ALP SERPL-CCNC: 165 U/L (ref 45–117)
ALP SERPL-CCNC: 169 U/L (ref 45–117)
ALT SERPL-CCNC: 114 U/L (ref 12–78)
ALT SERPL-CCNC: 173 U/L (ref 12–78)
ALT SERPL-CCNC: 192 U/L (ref 12–78)
ANION GAP SERPL CALC-SCNC: 8 MMOL/L (ref 5–15)
ANION GAP SERPL CALC-SCNC: 9 MMOL/L (ref 5–15)
ANION GAP SERPL CALC-SCNC: 9 MMOL/L (ref 5–15)
AST SERPL-CCNC: 131 U/L (ref 15–37)
AST SERPL-CCNC: 150 U/L (ref 15–37)
AST SERPL-CCNC: 79 U/L (ref 15–37)
BASOPHILS # BLD: 0 K/UL (ref 0–0.1)
BASOPHILS # BLD: 0 K/UL (ref 0–0.1)
BASOPHILS NFR BLD: 0 % (ref 0–1)
BASOPHILS NFR BLD: 0 % (ref 0–1)
BILIRUB SERPL-MCNC: 0.3 MG/DL (ref 0.2–1)
BILIRUB SERPL-MCNC: 0.4 MG/DL (ref 0.2–1)
BILIRUB SERPL-MCNC: 0.4 MG/DL (ref 0.2–1)
BUN SERPL-MCNC: 4 MG/DL (ref 6–20)
BUN/CREAT SERPL: 7 (ref 12–20)
BUN/CREAT SERPL: 7 (ref 12–20)
BUN/CREAT SERPL: 8 (ref 12–20)
CALCIUM SERPL-MCNC: 7.9 MG/DL (ref 8.5–10.1)
CALCIUM SERPL-MCNC: 8.1 MG/DL (ref 8.5–10.1)
CALCIUM SERPL-MCNC: 8.3 MG/DL (ref 8.5–10.1)
CHLORIDE SERPL-SCNC: 106 MMOL/L (ref 97–108)
CHLORIDE SERPL-SCNC: 107 MMOL/L (ref 97–108)
CHLORIDE SERPL-SCNC: 108 MMOL/L (ref 97–108)
CO2 SERPL-SCNC: 18 MMOL/L (ref 21–32)
CO2 SERPL-SCNC: 19 MMOL/L (ref 21–32)
CO2 SERPL-SCNC: 19 MMOL/L (ref 21–32)
CREAT SERPL-MCNC: 0.49 MG/DL (ref 0.55–1.02)
CREAT SERPL-MCNC: 0.55 MG/DL (ref 0.55–1.02)
CREAT SERPL-MCNC: 0.58 MG/DL (ref 0.55–1.02)
DIFFERENTIAL METHOD BLD: ABNORMAL
DIFFERENTIAL METHOD BLD: ABNORMAL
EOSINOPHIL # BLD: 0 K/UL (ref 0–0.4)
EOSINOPHIL # BLD: 0 K/UL (ref 0–0.4)
EOSINOPHIL NFR BLD: 0 % (ref 0–7)
EOSINOPHIL NFR BLD: 0 % (ref 0–7)
ERYTHROCYTE [DISTWIDTH] IN BLOOD BY AUTOMATED COUNT: 14.2 % (ref 11.5–14.5)
ERYTHROCYTE [DISTWIDTH] IN BLOOD BY AUTOMATED COUNT: 14.4 % (ref 11.5–14.5)
ERYTHROCYTE [DISTWIDTH] IN BLOOD BY AUTOMATED COUNT: 14.5 % (ref 11.5–14.5)
GLOBULIN SER CALC-MCNC: 3.8 G/DL (ref 2–4)
GLOBULIN SER CALC-MCNC: 3.9 G/DL (ref 2–4)
GLOBULIN SER CALC-MCNC: 4 G/DL (ref 2–4)
GLUCOSE BLD STRIP.AUTO-MCNC: 125 MG/DL (ref 65–117)
GLUCOSE BLD STRIP.AUTO-MCNC: 140 MG/DL (ref 65–117)
GLUCOSE BLD STRIP.AUTO-MCNC: 145 MG/DL (ref 65–117)
GLUCOSE BLD STRIP.AUTO-MCNC: 153 MG/DL (ref 65–117)
GLUCOSE BLD STRIP.AUTO-MCNC: 161 MG/DL (ref 65–117)
GLUCOSE BLD STRIP.AUTO-MCNC: 165 MG/DL (ref 65–117)
GLUCOSE SERPL-MCNC: 153 MG/DL (ref 65–100)
GLUCOSE SERPL-MCNC: 158 MG/DL (ref 65–100)
GLUCOSE SERPL-MCNC: 159 MG/DL (ref 65–100)
HCT VFR BLD AUTO: 31.1 % (ref 35–47)
HCT VFR BLD AUTO: 32.4 % (ref 35–47)
HCT VFR BLD AUTO: 34.4 % (ref 35–47)
HGB BLD-MCNC: 10.6 G/DL (ref 11.5–16)
HGB BLD-MCNC: 10.6 G/DL (ref 11.5–16)
HGB BLD-MCNC: 11.4 G/DL (ref 11.5–16)
IMM GRANULOCYTES # BLD AUTO: 0.1 K/UL (ref 0–0.04)
IMM GRANULOCYTES # BLD AUTO: 0.2 K/UL (ref 0–0.04)
IMM GRANULOCYTES NFR BLD AUTO: 1 % (ref 0–0.5)
IMM GRANULOCYTES NFR BLD AUTO: 2 % (ref 0–0.5)
LYMPHOCYTES # BLD: 1.2 K/UL (ref 0.8–3.5)
LYMPHOCYTES # BLD: 1.4 K/UL (ref 0.8–3.5)
LYMPHOCYTES NFR BLD: 12 % (ref 12–49)
LYMPHOCYTES NFR BLD: 9 % (ref 12–49)
MCH RBC QN AUTO: 29.3 PG (ref 26–34)
MCH RBC QN AUTO: 29.8 PG (ref 26–34)
MCH RBC QN AUTO: 30 PG (ref 26–34)
MCHC RBC AUTO-ENTMCNC: 32.7 G/DL (ref 30–36.5)
MCHC RBC AUTO-ENTMCNC: 33.1 G/DL (ref 30–36.5)
MCHC RBC AUTO-ENTMCNC: 34.1 G/DL (ref 30–36.5)
MCV RBC AUTO: 88.1 FL (ref 80–99)
MCV RBC AUTO: 89.5 FL (ref 80–99)
MCV RBC AUTO: 89.8 FL (ref 80–99)
MONOCYTES # BLD: 0.3 K/UL (ref 0–1)
MONOCYTES # BLD: 0.4 K/UL (ref 0–1)
MONOCYTES NFR BLD: 3 % (ref 5–13)
MONOCYTES NFR BLD: 3 % (ref 5–13)
NEUTS SEG # BLD: 11.3 K/UL (ref 1.8–8)
NEUTS SEG # BLD: 9.7 K/UL (ref 1.8–8)
NEUTS SEG NFR BLD: 84 % (ref 32–75)
NEUTS SEG NFR BLD: 86 % (ref 32–75)
NRBC # BLD: 0 K/UL (ref 0–0.01)
NRBC BLD-RTO: 0 PER 100 WBC
PLATELET # BLD AUTO: 182 K/UL (ref 150–400)
PLATELET # BLD AUTO: 185 K/UL (ref 150–400)
PLATELET # BLD AUTO: 192 K/UL (ref 150–400)
PMV BLD AUTO: 12.3 FL (ref 8.9–12.9)
PMV BLD AUTO: 12.4 FL (ref 8.9–12.9)
PMV BLD AUTO: 12.6 FL (ref 8.9–12.9)
POTASSIUM SERPL-SCNC: 3.6 MMOL/L (ref 3.5–5.1)
POTASSIUM SERPL-SCNC: 3.8 MMOL/L (ref 3.5–5.1)
POTASSIUM SERPL-SCNC: 3.9 MMOL/L (ref 3.5–5.1)
PROT SERPL-MCNC: 6.3 G/DL (ref 6.4–8.2)
PROT SERPL-MCNC: 6.4 G/DL (ref 6.4–8.2)
PROT SERPL-MCNC: 6.5 G/DL (ref 6.4–8.2)
RBC # BLD AUTO: 3.53 M/UL (ref 3.8–5.2)
RBC # BLD AUTO: 3.62 M/UL (ref 3.8–5.2)
RBC # BLD AUTO: 3.83 M/UL (ref 3.8–5.2)
SERVICE CMNT-IMP: ABNORMAL
SODIUM SERPL-SCNC: 134 MMOL/L (ref 136–145)
SODIUM SERPL-SCNC: 134 MMOL/L (ref 136–145)
SODIUM SERPL-SCNC: 135 MMOL/L (ref 136–145)
WBC # BLD AUTO: 11.6 K/UL (ref 3.6–11)
WBC # BLD AUTO: 13 K/UL (ref 3.6–11)
WBC # BLD AUTO: 13.3 K/UL (ref 3.6–11)

## 2021-10-26 PROCEDURE — 74011000250 HC RX REV CODE- 250: Performed by: STUDENT IN AN ORGANIZED HEALTH CARE EDUCATION/TRAINING PROGRAM

## 2021-10-26 PROCEDURE — 74011250637 HC RX REV CODE- 250/637: Performed by: OBSTETRICS & GYNECOLOGY

## 2021-10-26 PROCEDURE — 82962 GLUCOSE BLOOD TEST: CPT

## 2021-10-26 PROCEDURE — 77030014125 HC TY EPDRL BBMI -B: Performed by: ANESTHESIOLOGY

## 2021-10-26 PROCEDURE — 74011636637 HC RX REV CODE- 636/637: Performed by: OBSTETRICS & GYNECOLOGY

## 2021-10-26 PROCEDURE — 74011250636 HC RX REV CODE- 250/636: Performed by: OBSTETRICS & GYNECOLOGY

## 2021-10-26 PROCEDURE — 75410000002 HC LABOR FEE PER 1 HR

## 2021-10-26 PROCEDURE — 74011000250 HC RX REV CODE- 250: Performed by: ANESTHESIOLOGY

## 2021-10-26 PROCEDURE — 74011000258 HC RX REV CODE- 258: Performed by: OBSTETRICS & GYNECOLOGY

## 2021-10-26 PROCEDURE — 74011250637 HC RX REV CODE- 250/637

## 2021-10-26 PROCEDURE — 74011000250 HC RX REV CODE- 250

## 2021-10-26 PROCEDURE — 65410000002 HC RM PRIVATE OB

## 2021-10-26 PROCEDURE — 36415 COLL VENOUS BLD VENIPUNCTURE: CPT

## 2021-10-26 PROCEDURE — 80053 COMPREHEN METABOLIC PANEL: CPT

## 2021-10-26 PROCEDURE — 85025 COMPLETE CBC W/AUTO DIFF WBC: CPT

## 2021-10-26 PROCEDURE — 85027 COMPLETE CBC AUTOMATED: CPT

## 2021-10-26 PROCEDURE — 00HU33Z INSERTION OF INFUSION DEVICE INTO SPINAL CANAL, PERCUTANEOUS APPROACH: ICD-10-PCS | Performed by: ANESTHESIOLOGY

## 2021-10-26 RX ORDER — SODIUM CHLORIDE 0.9 % (FLUSH) 0.9 %
5-40 SYRINGE (ML) INJECTION EVERY 8 HOURS
Status: DISCONTINUED | OUTPATIENT
Start: 2021-10-26 | End: 2021-10-27

## 2021-10-26 RX ORDER — OXYTOCIN/RINGER'S LACTATE 30/500 ML
0-30 PLASTIC BAG, INJECTION (ML) INTRAVENOUS
Status: DISCONTINUED | OUTPATIENT
Start: 2021-10-26 | End: 2021-10-28 | Stop reason: ALTCHOICE

## 2021-10-26 RX ORDER — FENTANYL/BUPIVACAINE/NS/PF 2-1250MCG
1-16 PREFILLED PUMP RESERVOIR EPIDURAL CONTINUOUS
Status: DISCONTINUED | OUTPATIENT
Start: 2021-10-26 | End: 2021-10-28 | Stop reason: ALTCHOICE

## 2021-10-26 RX ORDER — BUPIVACAINE HYDROCHLORIDE AND EPINEPHRINE 2.5; 5 MG/ML; UG/ML
INJECTION, SOLUTION EPIDURAL; INFILTRATION; INTRACAUDAL; PERINEURAL AS NEEDED
Status: DISCONTINUED | OUTPATIENT
Start: 2021-10-26 | End: 2021-10-27 | Stop reason: HOSPADM

## 2021-10-26 RX ORDER — ACETAMINOPHEN 325 MG/1
TABLET ORAL
Status: COMPLETED
Start: 2021-10-26 | End: 2021-10-26

## 2021-10-26 RX ORDER — SODIUM CHLORIDE 0.9 % (FLUSH) 0.9 %
5-40 SYRINGE (ML) INJECTION AS NEEDED
Status: DISCONTINUED | OUTPATIENT
Start: 2021-10-26 | End: 2021-10-27

## 2021-10-26 RX ORDER — LABETALOL HYDROCHLORIDE 5 MG/ML
20 INJECTION, SOLUTION INTRAVENOUS ONCE
Status: COMPLETED | OUTPATIENT
Start: 2021-10-26 | End: 2021-10-26

## 2021-10-26 RX ORDER — INSULIN LISPRO 100 [IU]/ML
INJECTION, SOLUTION INTRAVENOUS; SUBCUTANEOUS
Status: DISCONTINUED | OUTPATIENT
Start: 2021-10-27 | End: 2021-10-27

## 2021-10-26 RX ORDER — FENTANYL/BUPIVACAINE/NS/PF 2-1250MCG
PREFILLED PUMP RESERVOIR EPIDURAL
Status: DISPENSED
Start: 2021-10-26 | End: 2021-10-27

## 2021-10-26 RX ORDER — ACETAMINOPHEN 325 MG/1
650 TABLET ORAL
Status: DISCONTINUED | OUTPATIENT
Start: 2021-10-26 | End: 2021-10-27

## 2021-10-26 RX ORDER — LABETALOL HYDROCHLORIDE 5 MG/ML
40 INJECTION, SOLUTION INTRAVENOUS ONCE
Status: COMPLETED | OUTPATIENT
Start: 2021-10-26 | End: 2021-10-26

## 2021-10-26 RX ORDER — OXYTOCIN/RINGER'S LACTATE 30/500 ML
0-20 PLASTIC BAG, INJECTION (ML) INTRAVENOUS
Status: DISCONTINUED | OUTPATIENT
Start: 2021-10-26 | End: 2021-10-26

## 2021-10-26 RX ADMIN — BUPIVACAINE HYDROCHLORIDE AND EPINEPHRINE 3 ML: 2.5; 5 INJECTION, SOLUTION EPIDURAL; INFILTRATION; INTRACAUDAL; PERINEURAL at 14:47

## 2021-10-26 RX ADMIN — MAGNESIUM SULFATE HEPTAHYDRATE 2 G/HR: 500 INJECTION, SOLUTION INTRAMUSCULAR; INTRAVENOUS at 10:40

## 2021-10-26 RX ADMIN — SODIUM CHLORIDE, POTASSIUM CHLORIDE, SODIUM LACTATE AND CALCIUM CHLORIDE 75 ML/HR: 600; 310; 30; 20 INJECTION, SOLUTION INTRAVENOUS at 04:02

## 2021-10-26 RX ADMIN — MAGNESIUM SULFATE HEPTAHYDRATE 2 G/HR: 500 INJECTION, SOLUTION INTRAMUSCULAR; INTRAVENOUS at 00:38

## 2021-10-26 RX ADMIN — LABETALOL HYDROCHLORIDE 20 MG: 5 INJECTION, SOLUTION INTRAVENOUS at 08:38

## 2021-10-26 RX ADMIN — BUPIVACAINE HYDROCHLORIDE AND EPINEPHRINE 3 ML: 2.5; 5 INJECTION, SOLUTION EPIDURAL; INFILTRATION; INTRACAUDAL; PERINEURAL at 14:48

## 2021-10-26 RX ADMIN — LABETALOL HYDROCHLORIDE 40 MG: 5 INJECTION INTRAVENOUS at 14:24

## 2021-10-26 RX ADMIN — INSULIN LISPRO 3 UNITS: 100 INJECTION, SOLUTION INTRAVENOUS; SUBCUTANEOUS at 12:05

## 2021-10-26 RX ADMIN — SODIUM CHLORIDE 2.5 MILLION UNITS: 900 INJECTION, SOLUTION INTRAVENOUS at 23:59

## 2021-10-26 RX ADMIN — MAGNESIUM SULFATE HEPTAHYDRATE 2 G/HR: 500 INJECTION, SOLUTION INTRAMUSCULAR; INTRAVENOUS at 05:30

## 2021-10-26 RX ADMIN — Medication 10 ML: at 14:21

## 2021-10-26 RX ADMIN — Medication 10 ML: at 14:00

## 2021-10-26 RX ADMIN — BETAMETHASONE SODIUM PHOSPHATE AND BETAMETHASONE ACETATE 12 MG: 3; 3 INJECTION, SUSPENSION INTRA-ARTICULAR; INTRALESIONAL; INTRAMUSCULAR at 12:46

## 2021-10-26 RX ADMIN — MAGNESIUM SULFATE HEPTAHYDRATE 2 G/HR: 500 INJECTION, SOLUTION INTRAMUSCULAR; INTRAVENOUS at 15:54

## 2021-10-26 RX ADMIN — Medication 12 ML/HR: at 22:20

## 2021-10-26 RX ADMIN — MISOPROSTOL 25 MCG: 100 TABLET ORAL at 00:12

## 2021-10-26 RX ADMIN — SODIUM CHLORIDE, POTASSIUM CHLORIDE, SODIUM LACTATE AND CALCIUM CHLORIDE 75 ML/HR: 600; 310; 30; 20 INJECTION, SOLUTION INTRAVENOUS at 14:59

## 2021-10-26 RX ADMIN — LABETALOL HYDROCHLORIDE 20 MG: 5 INJECTION INTRAVENOUS at 08:38

## 2021-10-26 RX ADMIN — ACETAMINOPHEN 650 MG: 325 TABLET ORAL at 21:10

## 2021-10-26 RX ADMIN — SODIUM CHLORIDE 2.5 MILLION UNITS: 900 INJECTION, SOLUTION INTRAVENOUS at 19:55

## 2021-10-26 RX ADMIN — SODIUM CHLORIDE, POTASSIUM CHLORIDE, SODIUM LACTATE AND CALCIUM CHLORIDE 75 ML/HR: 600; 310; 30; 20 INJECTION, SOLUTION INTRAVENOUS at 14:16

## 2021-10-26 RX ADMIN — SODIUM CHLORIDE, POTASSIUM CHLORIDE, SODIUM LACTATE AND CALCIUM CHLORIDE 75 ML/HR: 600; 310; 30; 20 INJECTION, SOLUTION INTRAVENOUS at 23:14

## 2021-10-26 RX ADMIN — INSULIN LISPRO 3 UNITS: 100 INJECTION, SOLUTION INTRAVENOUS; SUBCUTANEOUS at 19:53

## 2021-10-26 RX ADMIN — SODIUM CHLORIDE 2.5 MILLION UNITS: 900 INJECTION, SOLUTION INTRAVENOUS at 15:57

## 2021-10-26 RX ADMIN — SODIUM CHLORIDE 2.5 MILLION UNITS: 900 INJECTION, SOLUTION INTRAVENOUS at 12:01

## 2021-10-26 RX ADMIN — MAGNESIUM SULFATE HEPTAHYDRATE 2 G/HR: 500 INJECTION, SOLUTION INTRAMUSCULAR; INTRAVENOUS at 20:59

## 2021-10-26 RX ADMIN — OXYTOCIN 1 MILLI-UNITS/MIN: 10 INJECTION INTRAVENOUS at 04:31

## 2021-10-26 RX ADMIN — BUPIVACAINE HYDROCHLORIDE AND EPINEPHRINE 3 ML: 2.5; 5 INJECTION, SOLUTION EPIDURAL; INFILTRATION; INTRACAUDAL; PERINEURAL at 14:46

## 2021-10-26 RX ADMIN — INSULIN LISPRO 3 UNITS: 100 INJECTION, SOLUTION INTRAVENOUS; SUBCUTANEOUS at 00:18

## 2021-10-26 RX ADMIN — INSULIN LISPRO 3 UNITS: 100 INJECTION, SOLUTION INTRAVENOUS; SUBCUTANEOUS at 16:03

## 2021-10-26 RX ADMIN — Medication 10 ML: at 15:58

## 2021-10-26 RX ADMIN — INSULIN LISPRO 3 UNITS: 100 INJECTION, SOLUTION INTRAVENOUS; SUBCUTANEOUS at 04:37

## 2021-10-26 RX ADMIN — SODIUM CHLORIDE 2.5 MILLION UNITS: 900 INJECTION, SOLUTION INTRAVENOUS at 04:05

## 2021-10-26 RX ADMIN — Medication 12 ML/HR: at 15:06

## 2021-10-26 RX ADMIN — INSULIN LISPRO 3 UNITS: 100 INJECTION, SOLUTION INTRAVENOUS; SUBCUTANEOUS at 23:57

## 2021-10-26 RX ADMIN — LABETALOL HYDROCHLORIDE 20 MG: 5 INJECTION INTRAVENOUS at 14:10

## 2021-10-26 RX ADMIN — SODIUM CHLORIDE 2.5 MILLION UNITS: 900 INJECTION, SOLUTION INTRAVENOUS at 08:04

## 2021-10-26 RX ADMIN — SODIUM CHLORIDE 2.5 MILLION UNITS: 900 INJECTION, SOLUTION INTRAVENOUS at 00:00

## 2021-10-26 NOTE — CONSULTS
[facility]  Prenatal Consult  Karyn Moya MRN: [id number] [facility]: [id number]  Note Date: 10/26/2021? Note Time: 10:00:00  Place of Service:  InPatient? Requested By: Dickson Geiger MD  Maternal History  : 1990? Mother's Age: 27? Blood Type: A Pos?? RPR Serology: Non-Reactive? HIV: Negative? Rubella:  Non-Immune? GBS: Unknown? HBsAg: Negative? Prenatal Care: Yes? EDC OB:   Pregnancy Complications  Pre-eclampsia with pre-exist HTN, 3rd trimester (O11.3)  Premature rupture of membranes. > 24 hr onset of labor, unspecified tri (O42.119)  Gestational diabetes, insulin controlled (O24.414)  Maternal Steroids Yes  Last Dose Date: 10/26/2021 at 13:30:00? Maternal Medications: Yes  Prenatal vitamins  Betamethasone  Labetalol  Magnesium Sulfate  Insulin  Recommendations  I have reviewed the mother`s medical chart and spoken with the obstetrician requesting this consult. I met with the mother, father and maternal GM    I reviewed the most common problems encountered for babies born at 29 weeks gestation, stressing that all babies are different, and the chances of complications tend to lessen at advancing gestational ages. While most any organ system can have a complication, the absolute risk of severe complications is very low and the opportunity for a good outcome is high. Among infants admitted to the NICU, survival is approximately 99% and survival without severe complications is very high. I discussed the delivery room management, and explained the personnel that will be present at delivery. I reviewed the plan for delayed cord clamping (if appropriate) and thermoregulation support. Some infants at this age need respiratory support due to lung immaturity, commonly CPAP or a nasal cannula, though some need mechanical ventilation. We also discussed the use of artificial surfactant, which may or may not be needed.   While uncommon, some infants need other measures in their resuscitation (e.g. CPR, fluid, blood). I reviewed the typical cares given during admission to the NICU. Some infants at this gestation need IV access and that may be an umbilical catheter(s), PICC line or peripheral IV to allow nutrition. When sufficiently stable, gavage feedings will be started. We discussed the critical importance of lactation to optimize outcome (improved neurodevelopment, reduced risk of NEC and infections among other things). We discussed how we will support mothers lactation. We discussed common discharge goals, including mature thermoregulation, mature respiratory status and ability to thrive on oral feedings. Many infants achieve this around 36-39 weeks corrected gestation, although some infants are ready sooner and some take longer. Time was allotted for the family to ask questions, and all questions were answered to the best of my ability, given the information provided. The family understands and agrees with the present plan. Thank you for inviting me to speak with the family. We remain available if the family desires further discussion or clarification. The total length of floor unit time was 30.00 minute(s). Counseling and/or coordination of care dominated more than fifty percent of the time.   Authenticated by: Kj Tello MD   Date/Time: 10/26/2021 11:54

## 2021-10-26 NOTE — PROGRESS NOTES
Labor Progress Note  Patient seen, fetal heart rate and contraction pattern evaluated. She denies headache or vision changes  She reports some cramping  Continued clear leaking of fluid    Physical Exam:  Visit Vitals  /74 (BP 1 Location: Right upper arm, BP Patient Position: Lying left side)   Pulse 93   Temp 97.7 °F (36.5 °C)   Resp 16   Ht 5' 3\" (1.6 m)   Wt 109.3 kg (241 lb)   SpO2 98%   BMI 42.69 kg/m²     Cervical Exam: 1/70/-3, mid and soft  Vertex  Membranes:  Ruptured, clear  Uterine Activity: no regular contractions  Fetal Heart Rate: 115-120's,  adequate variability and reactivity  Accelerations: yes  Decelerations:no    Assessment/Plan:  Reassuring fetal status. 29 y/o G1 @ 34 weeks with severe pre eclampsia    1. IUP- category 1    2. Severe pre eclampsia- repeat labs were stable, blood pressure has also been under control since 20mg of IV labetalol yesterday. Continue magnesium for seizure prophylaxis    3. DVT prophylaxis- SCDs    4. Induction, PPROM- receiving betamethasone, penicillin for GBS unknown. S/p 3 doses of cytotec. Will initiate pitocin at 4am.     5. Gestational diabetes- sliding insulin scale.

## 2021-10-26 NOTE — PROGRESS NOTES
Pt here from office for PPROM last night at 2330. Reports clear, thin fluid. G1 34w2d. Reports +FM, denies vag bleeding. Hx of gDM diet controlled. 1230: Dr. Sarah Dodge at bedside. Speculum exam performed, amnisure collected. Amnisure +. 1430: Severe range pressure noted 160s/70-80s noted. Plan to start magnesium. 1433: /67    1906: /77 per Dr. Sarah Dodge Labetalol 20 mg IV given. 1920: Bedside shift change report given to BRENDAN Jha RN by [address], [address] [facility] turned over at this time. Hourly Rounding performed by RN.   Frank Bowen RNC

## 2021-10-26 NOTE — ANESTHESIA PREPROCEDURE EVALUATION
Relevant Problems   No relevant active problems       Anesthetic History   No history of anesthetic complications            Review of Systems / Medical History  Patient summary reviewed and pertinent labs reviewed    Pulmonary            Asthma        Neuro/Psych   Within defined limits           Cardiovascular    Hypertension              Exercise tolerance: >4 METS     GI/Hepatic/Renal  Within defined limits              Endo/Other    Diabetes    Morbid obesity     Other Findings   Comments: Pre-eclampsia with severe features           Physical Exam    Airway  Mallampati: III  TM Distance: > 6 cm  Neck ROM: normal range of motion   Mouth opening: Normal     Cardiovascular  Regular rate and rhythm,  S1 and S2 normal,  no murmur, click, rub, or gallop  Rhythm: regular  Rate: normal         Dental  No notable dental hx       Pulmonary  Breath sounds clear to auscultation               Abdominal  GI exam deferred       Other Findings            Anesthetic Plan    ASA: 3  Anesthesia type: epidural      Post-op pain plan if not by surgeon: indwelling epidural catheter      Anesthetic plan and risks discussed with: Patient

## 2021-10-26 NOTE — ANESTHESIA PROCEDURE NOTES
Epidural Block    Patient location during procedure: OB  Start time: 10/26/2021 3:35 PM  End time: 10/26/2021 3:50 PM  Reason for block: labor epidural  Staffing  Performed: attending   Anesthesiologist: Lucho Morales MD  Preanesthetic Checklist  Completed: patient identified, IV checked, site marked, risks and benefits discussed, surgical consent, monitors and equipment checked, pre-op evaluation and timeout performed  Block Placement  Patient position: sitting  Prep: ChloraPrep  Sterility prep: cap, drape, gloves, gown and mask  Sedation level: no sedation  Patient monitoring: continuous pulse oximetry, frequent blood pressure checks and heart rate  Approach: midline  Location: lumbar  Lumbar location: L3-L4  Epidural  Loss of resistance technique: air  Guidance: landmark technique  Needle  Needle type: Tuohy   Needle gauge: 17 G  Needle length: 9 cm  Needle insertion depth: 8 cm  Catheter type: end hole  Catheter size: 19 G  Catheter at skin depth: 12 cm  Catheter securement method: clear occlusive dressing and surgical tape  Assessment  Block outcome: pain improved  Number of attempts: 1  Procedure assessment: patient tolerated procedure well with no immediate complications

## 2021-10-26 NOTE — PROGRESS NOTES
0720. Bedside shift change report given to BRENDAN Chen RN (oncoming nurse) by Anita Jacobs. Shanna Price RN (offgoing nurse). Report included the following information SBAR, Kardex, Procedure Summary, Intake/Output, MAR and Recent Results. 3886. Spoke with Dr. Rosalba Perez about the patients blood pressure. Verbal orders received to follow the Labetalol algorithm. 2752. 20mg of Labetalol given IV.    1226. Dr. Rosalba Perez at the bedside to assess patient and her cervix. Cervix 1cm. Dr. Rosalba Perez placed a cook catheter with 60mL in each balloon. Verbal orders received to continue Pitocin at 20 kevin-units/min.     1331. Elevated BP noted. Spoke with Dr. Rosalba Perez about it. Verbal orders received to follow the Labetalol algorithm.    1354. Bolus for epidural started. 1410. 20mg of Labetalol given IV    1424. BP not responding to the 20mg, 40mg of Labetalol given IV.     1426. Anesthesiology called for placement of epidural.    1435. Epidural started. Patient up to the side of the bed in position for epidural.     1455. Epidural completed. Patient laid down, patient placed back onto fetal monitoring. [address] [facility] Dr. Rosalba Perez at the bedside to assess patient and talk with her. Verbal orders to decrease Pitocin.     1722. Pitocin decreased to 16 kevin-units/min per MD.     1915. Verbal orders from Dr. Rosalba Perez to decrease the Pitocin to 10 kevin-units/min and give a 300mL fluid bolus. 1845. Dr. Rosalba Perez at the bedside to assess patient. Ramos balloon out. Cervix 4-5/80/-2.     1858. IUPC placed. Verbal orders to increase Pitocin again.    1920. Bedside shift change report given to BRENDAN Price RN (oncoming nurse) by Anita Jacobs. Patricio Chen RN (offgoing nurse). Report included the following information SBAR, Kardex, Procedure Summary, Intake/Output, MAR and Recent Results.

## 2021-10-26 NOTE — PROGRESS NOTES
Labor Progress Note  Patient seen, fetal heart rate and contraction pattern evaluated. She denies headache or vision changes  She reports some cramping  Continued clear leaking of fluid    Physical Exam:  Visit Vitals  BP (!) 178/96   Pulse 83   Temp 97.5 °F (36.4 °C)   Resp 16   Ht 5' 3\" (1.6 m)   Wt 109.3 kg (241 lb)   SpO2 98%   BMI 42.69 kg/m²     Cervical Exam: (last @ 0200) 1/70/-3, mid and soft  Vertex  Membranes:  Ruptured, clear  Uterine Activity: no regular contractions  Fetal Heart Rate: 115-120's,  adequate variability and reactivity  Accelerations: yes  Decelerations:no    Assessment/Plan:    29 y/o G1 @ 34w3d, IOL for PPROM w severe pre eclampsia    1. IUP- category 1    2. Severe pre eclampsia-   - Severe range BP - will treat w 20mg IV labetalol and follow labetalol algorithm as needed  - Repeat labs were stable  - Continue magnesium for seizure prophylaxis    3. DVT prophylaxis- SCDs    4. Induction, PPROM- receiving betamethasone, penicillin for GBS unknown. S/p 3 doses of cytotec. - Continue pitocin     5.  Gestational diabetes-   - Bg checks q4 hours in latent labor  - Bg checks q1 hour in active labor  - Correction dose Lispro      Varghese Wade MD

## 2021-10-26 NOTE — PROGRESS NOTES
7:20 PM  Bedside shift change report given to Juan Alberto Dey RN (oncoming nurse) by Joanna Villalobos RN (offgoing nurse). Report included the following information SBAR, Kardex, Intake/Output, MAR and Recent Results. 2:26 AM  SVE performed by Dr Shae Hare. Cervix 1/70/-3.     4:31 AM  Pitocin started as ordered by Dr Shae Hare.

## 2021-10-26 NOTE — PROGRESS NOTES
Reviewed labs, stable from earlier labs. Continue magnesium, plan for induction  Elevated blood pressure improved after 20mg of IV labetalol    Results for Abril Oleary (MRN [id number]) as of 10/25/2021 20:35   Ref. Range 10/25/2021 19:00   WBC Latest Ref Range: 3.6 - 11.0 K/uL 11.7 (H)   NRBC Latest Ref Range: 0  WBC 0.0   RBC Latest Ref Range: 3.80 - 5.20 M/uL 3.83   HGB Latest Ref Range: 11.5 - 16.0 g/dL 11.6   HCT Latest Ref Range: 35.0 - 47.0 % 33.8 (L)   MCV Latest Ref Range: 80.0 - 99.0 FL 88.3   MCH Latest Ref Range: 26.0 - 34.0 PG 30.3   MCHC Latest Ref Range: 30.0 - 36.5 g/dL 34.3   RDW Latest Ref Range: 11.5 - 14.5 % 14.4   PLATELET Latest Ref Range: 150 - 400 K/uL 179   MPV Latest Ref Range: 8.9 - 12.9 FL 12.5   NEUTROPHILS Latest Ref Range: 32 - 75 % 86 (H)   LYMPHOCYTES Latest Ref Range: 12 - 49 % 11 (L)   MONOCYTES Latest Ref Range: 5 - 13 % 2 (L)   EOSINOPHILS Latest Ref Range: 0 - 7 % 0   BASOPHILS Latest Ref Range: 0 - 1 % 0   IMMATURE GRANULOCYTES Latest Ref Range: 0.0 - 0.5 % 1 (H)   DF Latest Units:   AUTOMATED   ABSOLUTE NRBC Latest Ref Range: 0.00 - 0.01 K/uL 0.00   ABS. NEUTROPHILS Latest Ref Range: 1.8 - 8.0 K/UL 10.1 (H)   ABS. IMM. GRANS. Latest Ref Range: 0.00 - 0.04 K/UL 0.1 (H)   ABS. LYMPHOCYTES Latest Ref Range: 0.8 - 3.5 K/UL 1.3   ABS. MONOCYTES Latest Ref Range: 0.0 - 1.0 K/UL 0.2   ABS. EOSINOPHILS Latest Ref Range: 0.0 - 0.4 K/UL 0.0   ABS.  BASOPHILS Latest Ref Range: 0.0 - 0.1 K/UL 0.0   Sodium Latest Ref Range: 136 - 145 mmol/L 133 (L)   Potassium Latest Ref Range: 3.5 - 5.1 mmol/L 3.7   Chloride Latest Ref Range: 97 - 108 mmol/L 104   CO2 Latest Ref Range: 21 - 32 mmol/L 19 (L)   Anion gap Latest Ref Range: 5 - 15 mmol/L 10   Glucose Latest Ref Range: 65 - 100 mg/dL 158 (H)   BUN Latest Ref Range: 6 - 20 MG/DL 4 (L)   Creatinine Latest Ref Range: 0.55 - 1.02 MG/DL 0.65   BUN/Creatinine ratio Latest Ref Range: 12 - 20   6 (L)   Calcium Latest Ref Range: 8.5 - 10.1 MG/DL 9.2   GFR est non-AA Latest Ref Range: >60 ml/min/1.73m2 >60   GFR est AA Latest Ref Range: >60 ml/min/1.73m2 >60   Bilirubin, total Latest Ref Range: 0.2 - 1.0 MG/DL 0.3   Protein, total Latest Ref Range: 6.4 - 8.2 g/dL 6.8   Albumin Latest Ref Range: 3.5 - 5.0 g/dL 2.7 (L)   Globulin Latest Ref Range: 2.0 - 4.0 g/dL 4.1 (H)   A-G Ratio Latest Ref Range: 1.1 - 2.2   0.7 (L)   ALT Latest Ref Range: 12 - 78 U/L 104 (H)   AST Latest Ref Range: 15 - 37 U/L 64 (H)   Alk.  phosphatase Latest Ref Range: 45 - 117 U/L 174 (H)

## 2021-10-26 NOTE — PROGRESS NOTES
Nutrition: MST referral received for gestational DM. Patient admitted for IOL for PPROM with severe preeclampsia. Currently NPO. Nutrition assessment not indicated at this time. RD available by consult if needs arise. Thanks.   Carol Mcintosh RD

## 2021-10-26 NOTE — PROGRESS NOTES
Labor Progress Note    Patient seen, fetal heart rate and contraction pattern evaluated. Not appreciating contractions at this time. Mild cramping. Physical Exam:  Visit Vitals  BP (!) 141/80   Pulse 78   Temp 97.9 °F (36.6 °C)   Resp 16   Ht 5' 3\" (1.6 m)   Wt 109.3 kg (241 lb)   SpO2 95%   BMI 42.69 kg/m²     Cervical Exam: (last @ 0200) 1/70/-3, mid and soft - Vertex  Membranes:  Ruptured, clear  Uterine Activity: Irregular contractions, q5-10 min  Fetal Heart Rate: 120's,  adequate variability and reactivity  Accelerations: yes  Decelerations:no    Assessment/Plan:    27 y/o G1 @ 34w3d, IOL for PPROM w severe pre eclampsia    1. IUP- category 1    2. Severe pre eclampsia-   - S/p IV labetalol 20mg on 2 separate occasions; mild range presently  - Repeat labs 1700  - Continue magnesium for seizure prophylaxis    3. DVT prophylaxis- SCDs    4. Induction, PPROM- receiving betamethasone, penicillin for GBS unknown. S/p 3 doses of cytotec. - Continue pitocin   - Cervical exam unchanged; CRB placed    5.  Gestational diabetes-   - Bg checks q4 hours in latent labor  - Bg checks q1 hour in active labor  - Correction dose Lispro      Wesley Welch MD

## 2021-10-26 NOTE — PROGRESS NOTES
Progress Note:    PIH labs repeated:  Hgb 10.6 (11.4)  Plt 182 (185)  Cr 0.55 (0.58)   (114)   (79)    Patient reassessed. CRB in vagina with cervix now 4-5/80/-2. Cat 1 tracing  Predominantly normotensive to mild range BPs at this time, not requiring treatment  Exam benign. No RUQ pain nor tenderness. Discussed with patient and family rising liver enzymes. As patient has made appropriate progress and BPs have stabilized - will continue to increase pitocin and repeat labs in 3 hours. If continuing to rise significantly and patient is remote from delivery would consider proceeding with CS.     Patient and family expressed understanding    Conor Reynolds MD

## 2021-10-26 NOTE — PROGRESS NOTES
Progress Note:    Patient with Severe range BPs in setting of pain starting at 1330. Treated with 20mg IV Labetalol followed by 40mg IV Labetalol. Patient with increasing pain, decision made to proceed with epidural. Further IV treatment held at this time in anticipation of potential BP changes with epidural. Following placement of epidural BPs resolved to mild range (140s/60s). Patient comfortable on exam. Will continue to monitor. Repeat PreE labs at 1700.     Manjit Elias MD

## 2021-10-27 LAB
ALBUMIN SERPL-MCNC: 2 G/DL (ref 3.5–5)
ALBUMIN SERPL-MCNC: 2.1 G/DL (ref 3.5–5)
ALBUMIN/GLOB SERPL: 0.6 {RATIO} (ref 1.1–2.2)
ALBUMIN/GLOB SERPL: 0.6 {RATIO} (ref 1.1–2.2)
ALP SERPL-CCNC: 142 U/L (ref 45–117)
ALP SERPL-CCNC: 148 U/L (ref 45–117)
ALT SERPL-CCNC: 211 U/L (ref 12–78)
ALT SERPL-CCNC: 211 U/L (ref 12–78)
ANION GAP SERPL CALC-SCNC: 11 MMOL/L (ref 5–15)
ANION GAP SERPL CALC-SCNC: 6 MMOL/L (ref 5–15)
AST SERPL-CCNC: 143 U/L (ref 15–37)
AST SERPL-CCNC: 167 U/L (ref 15–37)
BASOPHILS # BLD: 0 K/UL (ref 0–0.1)
BASOPHILS # BLD: 0 K/UL (ref 0–0.1)
BASOPHILS NFR BLD: 0 % (ref 0–1)
BASOPHILS NFR BLD: 0 % (ref 0–1)
BILIRUB SERPL-MCNC: 0.3 MG/DL (ref 0.2–1)
BILIRUB SERPL-MCNC: 0.6 MG/DL (ref 0.2–1)
BUN SERPL-MCNC: 4 MG/DL (ref 6–20)
BUN SERPL-MCNC: 4 MG/DL (ref 6–20)
BUN/CREAT SERPL: 6 (ref 12–20)
BUN/CREAT SERPL: 7 (ref 12–20)
CALCIUM SERPL-MCNC: 7.8 MG/DL (ref 8.5–10.1)
CALCIUM SERPL-MCNC: 7.8 MG/DL (ref 8.5–10.1)
CHLORIDE SERPL-SCNC: 102 MMOL/L (ref 97–108)
CHLORIDE SERPL-SCNC: 107 MMOL/L (ref 97–108)
CO2 SERPL-SCNC: 17 MMOL/L (ref 21–32)
CO2 SERPL-SCNC: 25 MMOL/L (ref 21–32)
CREAT SERPL-MCNC: 0.6 MG/DL (ref 0.55–1.02)
CREAT SERPL-MCNC: 0.66 MG/DL (ref 0.55–1.02)
DIFFERENTIAL METHOD BLD: ABNORMAL
DIFFERENTIAL METHOD BLD: ABNORMAL
EOSINOPHIL # BLD: 0 K/UL (ref 0–0.4)
EOSINOPHIL # BLD: 0.2 K/UL (ref 0–0.4)
EOSINOPHIL NFR BLD: 0 % (ref 0–7)
EOSINOPHIL NFR BLD: 1 % (ref 0–7)
ERYTHROCYTE [DISTWIDTH] IN BLOOD BY AUTOMATED COUNT: 14.6 % (ref 11.5–14.5)
ERYTHROCYTE [DISTWIDTH] IN BLOOD BY AUTOMATED COUNT: 14.6 % (ref 11.5–14.5)
GLOBULIN SER CALC-MCNC: 3.3 G/DL (ref 2–4)
GLOBULIN SER CALC-MCNC: 3.5 G/DL (ref 2–4)
GLUCOSE BLD STRIP.AUTO-MCNC: 145 MG/DL (ref 65–117)
GLUCOSE BLD STRIP.AUTO-MCNC: 149 MG/DL (ref 65–117)
GLUCOSE BLD STRIP.AUTO-MCNC: 160 MG/DL (ref 65–117)
GLUCOSE SERPL-MCNC: 118 MG/DL (ref 65–100)
GLUCOSE SERPL-MCNC: 171 MG/DL (ref 65–100)
HCT VFR BLD AUTO: 26.5 % (ref 35–47)
HCT VFR BLD AUTO: 29.5 % (ref 35–47)
HGB BLD-MCNC: 8.8 G/DL (ref 11.5–16)
HGB BLD-MCNC: 9.8 G/DL (ref 11.5–16)
IMM GRANULOCYTES # BLD AUTO: 0.2 K/UL (ref 0–0.04)
IMM GRANULOCYTES # BLD AUTO: 0.3 K/UL (ref 0–0.04)
IMM GRANULOCYTES NFR BLD AUTO: 1 % (ref 0–0.5)
IMM GRANULOCYTES NFR BLD AUTO: 2 % (ref 0–0.5)
LYMPHOCYTES # BLD: 1.5 K/UL (ref 0.8–3.5)
LYMPHOCYTES # BLD: 2.8 K/UL (ref 0.8–3.5)
LYMPHOCYTES NFR BLD: 17 % (ref 12–49)
LYMPHOCYTES NFR BLD: 9 % (ref 12–49)
MCH RBC QN AUTO: 29.2 PG (ref 26–34)
MCH RBC QN AUTO: 29.6 PG (ref 26–34)
MCHC RBC AUTO-ENTMCNC: 33.2 G/DL (ref 30–36.5)
MCHC RBC AUTO-ENTMCNC: 33.2 G/DL (ref 30–36.5)
MCV RBC AUTO: 88 FL (ref 80–99)
MCV RBC AUTO: 89.1 FL (ref 80–99)
MONOCYTES # BLD: 0.8 K/UL (ref 0–1)
MONOCYTES # BLD: 1 K/UL (ref 0–1)
MONOCYTES NFR BLD: 5 % (ref 5–13)
MONOCYTES NFR BLD: 6 % (ref 5–13)
NEUTS SEG # BLD: 12.2 K/UL (ref 1.8–8)
NEUTS SEG # BLD: 13.9 K/UL (ref 1.8–8)
NEUTS SEG NFR BLD: 75 % (ref 32–75)
NEUTS SEG NFR BLD: 84 % (ref 32–75)
NRBC # BLD: 0 K/UL (ref 0–0.01)
NRBC # BLD: 0 K/UL (ref 0–0.01)
NRBC BLD-RTO: 0 PER 100 WBC
NRBC BLD-RTO: 0 PER 100 WBC
PLATELET # BLD AUTO: 198 K/UL (ref 150–400)
PLATELET # BLD AUTO: 199 K/UL (ref 150–400)
PMV BLD AUTO: 12.1 FL (ref 8.9–12.9)
PMV BLD AUTO: 12.5 FL (ref 8.9–12.9)
POTASSIUM SERPL-SCNC: 3.6 MMOL/L (ref 3.5–5.1)
POTASSIUM SERPL-SCNC: 3.8 MMOL/L (ref 3.5–5.1)
PROT SERPL-MCNC: 5.4 G/DL (ref 6.4–8.2)
PROT SERPL-MCNC: 5.5 G/DL (ref 6.4–8.2)
RBC # BLD AUTO: 3.01 M/UL (ref 3.8–5.2)
RBC # BLD AUTO: 3.31 M/UL (ref 3.8–5.2)
SERVICE CMNT-IMP: ABNORMAL
SODIUM SERPL-SCNC: 133 MMOL/L (ref 136–145)
SODIUM SERPL-SCNC: 135 MMOL/L (ref 136–145)
WBC # BLD AUTO: 16.4 K/UL (ref 3.6–11)
WBC # BLD AUTO: 16.5 K/UL (ref 3.6–11)

## 2021-10-27 PROCEDURE — 74011000250 HC RX REV CODE- 250: Performed by: ANESTHESIOLOGY

## 2021-10-27 PROCEDURE — 65410000002 HC RM PRIVATE OB

## 2021-10-27 PROCEDURE — 85025 COMPLETE CBC W/AUTO DIFF WBC: CPT

## 2021-10-27 PROCEDURE — 80053 COMPREHEN METABOLIC PANEL: CPT

## 2021-10-27 PROCEDURE — 0UQMXZZ REPAIR VULVA, EXTERNAL APPROACH: ICD-10-PCS | Performed by: OBSTETRICS & GYNECOLOGY

## 2021-10-27 PROCEDURE — 75410000000 HC DELIVERY VAGINAL/SINGLE

## 2021-10-27 PROCEDURE — 76060000078 HC EPIDURAL ANESTHESIA

## 2021-10-27 PROCEDURE — 74011250636 HC RX REV CODE- 250/636: Performed by: OBSTETRICS & GYNECOLOGY

## 2021-10-27 PROCEDURE — 74011250637 HC RX REV CODE- 250/637: Performed by: OBSTETRICS & GYNECOLOGY

## 2021-10-27 PROCEDURE — 76815 OB US LIMITED FETUS(S): CPT

## 2021-10-27 PROCEDURE — 36415 COLL VENOUS BLD VENIPUNCTURE: CPT

## 2021-10-27 PROCEDURE — 3E0P7VZ INTRODUCTION OF HORMONE INTO FEMALE REPRODUCTIVE, VIA NATURAL OR ARTIFICIAL OPENING: ICD-10-PCS | Performed by: OBSTETRICS & GYNECOLOGY

## 2021-10-27 PROCEDURE — 75410000003 HC RECOV DEL/VAG/CSECN EA 0.5 HR

## 2021-10-27 PROCEDURE — 74011000250 HC RX REV CODE- 250: Performed by: OBSTETRICS & GYNECOLOGY

## 2021-10-27 PROCEDURE — 75410000002 HC LABOR FEE PER 1 HR

## 2021-10-27 PROCEDURE — 74011000258 HC RX REV CODE- 258: Performed by: OBSTETRICS & GYNECOLOGY

## 2021-10-27 PROCEDURE — 88307 TISSUE EXAM BY PATHOLOGIST: CPT

## 2021-10-27 PROCEDURE — 74011636637 HC RX REV CODE- 636/637: Performed by: OBSTETRICS & GYNECOLOGY

## 2021-10-27 PROCEDURE — 3E033VJ INTRODUCTION OF OTHER HORMONE INTO PERIPHERAL VEIN, PERCUTANEOUS APPROACH: ICD-10-PCS | Performed by: OBSTETRICS & GYNECOLOGY

## 2021-10-27 PROCEDURE — 77010026064 HC OXYGEN INFANT MED AIR MIN

## 2021-10-27 RX ORDER — OXYTOCIN/RINGER'S LACTATE 30/500 ML
10 PLASTIC BAG, INJECTION (ML) INTRAVENOUS AS NEEDED
Status: COMPLETED | OUTPATIENT
Start: 2021-10-27 | End: 2021-10-27

## 2021-10-27 RX ORDER — HYDROCORTISONE ACETATE PRAMOXINE HCL 2.5; 1 G/100G; G/100G
CREAM TOPICAL AS NEEDED
Status: DISCONTINUED | OUTPATIENT
Start: 2021-10-27 | End: 2021-10-29 | Stop reason: HOSPADM

## 2021-10-27 RX ORDER — CARBOPROST TROMETHAMINE 250 UG/ML
250 INJECTION, SOLUTION INTRAMUSCULAR ONCE
Status: COMPLETED | OUTPATIENT
Start: 2021-10-27 | End: 2021-10-27

## 2021-10-27 RX ORDER — INSULIN LISPRO 100 [IU]/ML
3 INJECTION, SOLUTION INTRAVENOUS; SUBCUTANEOUS ONCE
Status: COMPLETED | OUTPATIENT
Start: 2021-10-27 | End: 2021-10-27

## 2021-10-27 RX ORDER — OXYTOCIN/RINGER'S LACTATE 30/500 ML
87.3 PLASTIC BAG, INJECTION (ML) INTRAVENOUS AS NEEDED
Status: DISCONTINUED | OUTPATIENT
Start: 2021-10-27 | End: 2021-10-28 | Stop reason: ALTCHOICE

## 2021-10-27 RX ORDER — NALOXONE HYDROCHLORIDE 0.4 MG/ML
0.4 INJECTION, SOLUTION INTRAMUSCULAR; INTRAVENOUS; SUBCUTANEOUS AS NEEDED
Status: DISCONTINUED | OUTPATIENT
Start: 2021-10-27 | End: 2021-10-29 | Stop reason: HOSPADM

## 2021-10-27 RX ORDER — MISOPROSTOL 200 UG/1
TABLET ORAL
Status: DISPENSED
Start: 2021-10-27 | End: 2021-10-27

## 2021-10-27 RX ORDER — LABETALOL HYDROCHLORIDE 5 MG/ML
20 INJECTION, SOLUTION INTRAVENOUS
Status: COMPLETED | OUTPATIENT
Start: 2021-10-27 | End: 2021-10-27

## 2021-10-27 RX ORDER — ONDANSETRON 4 MG/1
4 TABLET, ORALLY DISINTEGRATING ORAL
Status: ACTIVE | OUTPATIENT
Start: 2021-10-27 | End: 2021-10-28

## 2021-10-27 RX ORDER — OXYCODONE AND ACETAMINOPHEN 5; 325 MG/1; MG/1
2 TABLET ORAL
Status: DISCONTINUED | OUTPATIENT
Start: 2021-10-27 | End: 2021-10-29 | Stop reason: HOSPADM

## 2021-10-27 RX ORDER — BUPIVACAINE HYDROCHLORIDE 2.5 MG/ML
INJECTION, SOLUTION EPIDURAL; INFILTRATION; INTRACAUDAL
Status: COMPLETED
Start: 2021-10-27 | End: 2021-10-27

## 2021-10-27 RX ORDER — IBUPROFEN 400 MG/1
800 TABLET ORAL EVERY 8 HOURS
Status: DISCONTINUED | OUTPATIENT
Start: 2021-10-27 | End: 2021-10-29 | Stop reason: HOSPADM

## 2021-10-27 RX ORDER — BUPIVACAINE HYDROCHLORIDE 2.5 MG/ML
INJECTION, SOLUTION EPIDURAL; INFILTRATION; INTRACAUDAL AS NEEDED
Status: DISCONTINUED | OUTPATIENT
Start: 2021-10-27 | End: 2021-10-27 | Stop reason: HOSPADM

## 2021-10-27 RX ORDER — DIPHENOXYLATE HYDROCHLORIDE AND ATROPINE SULFATE 2.5; .025 MG/1; MG/1
2 TABLET ORAL ONCE
Status: COMPLETED | OUTPATIENT
Start: 2021-10-27 | End: 2021-10-27

## 2021-10-27 RX ORDER — TRANEXAMIC ACID 100 MG/ML
1 INJECTION, SOLUTION INTRAVENOUS
Status: COMPLETED | OUTPATIENT
Start: 2021-10-27 | End: 2021-10-27

## 2021-10-27 RX ORDER — MISOPROSTOL 200 UG/1
800 TABLET ORAL ONCE
Status: COMPLETED | OUTPATIENT
Start: 2021-10-27 | End: 2021-10-27

## 2021-10-27 RX ORDER — SIMETHICONE 80 MG
80 TABLET,CHEWABLE ORAL
Status: DISCONTINUED | OUTPATIENT
Start: 2021-10-27 | End: 2021-10-29 | Stop reason: HOSPADM

## 2021-10-27 RX ORDER — ZOLPIDEM TARTRATE 5 MG/1
5 TABLET ORAL
Status: DISCONTINUED | OUTPATIENT
Start: 2021-10-27 | End: 2021-10-29 | Stop reason: HOSPADM

## 2021-10-27 RX ORDER — ADHESIVE BANDAGE
30 BANDAGE TOPICAL DAILY PRN
Status: DISCONTINUED | OUTPATIENT
Start: 2021-10-27 | End: 2021-10-29 | Stop reason: HOSPADM

## 2021-10-27 RX ORDER — DIPHENHYDRAMINE HCL 25 MG
25 CAPSULE ORAL
Status: DISCONTINUED | OUTPATIENT
Start: 2021-10-27 | End: 2021-10-29 | Stop reason: HOSPADM

## 2021-10-27 RX ORDER — ACETAMINOPHEN 325 MG/1
650 TABLET ORAL
Status: DISCONTINUED | OUTPATIENT
Start: 2021-10-27 | End: 2021-10-29 | Stop reason: HOSPADM

## 2021-10-27 RX ORDER — OXYTOCIN/RINGER'S LACTATE 30/500 ML
PLASTIC BAG, INJECTION (ML) INTRAVENOUS
Status: DISPENSED
Start: 2021-10-27 | End: 2021-10-28

## 2021-10-27 RX ADMIN — CARBOPROST TROMETHAMINE 250 MCG: 250 INJECTION, SOLUTION INTRAMUSCULAR at 04:16

## 2021-10-27 RX ADMIN — BUPIVACAINE HYDROCHLORIDE 3 ML: 2.5 INJECTION, SOLUTION EPIDURAL; INFILTRATION; INTRACAUDAL; PERINEURAL at 02:43

## 2021-10-27 RX ADMIN — SERTRALINE 150 MG: 50 TABLET, FILM COATED ORAL at 22:02

## 2021-10-27 RX ADMIN — Medication 10 ML: at 08:07

## 2021-10-27 RX ADMIN — MAGNESIUM SULFATE HEPTAHYDRATE 2 G/HR: 500 INJECTION, SOLUTION INTRAMUSCULAR; INTRAVENOUS at 12:56

## 2021-10-27 RX ADMIN — INSULIN LISPRO 3 UNITS: 100 INJECTION, SOLUTION INTRAVENOUS; SUBCUTANEOUS at 02:05

## 2021-10-27 RX ADMIN — MISOPROSTOL 800 MCG: 200 TABLET ORAL at 04:06

## 2021-10-27 RX ADMIN — MAGNESIUM SULFATE HEPTAHYDRATE 2 G/HR: 500 INJECTION, SOLUTION INTRAMUSCULAR; INTRAVENOUS at 23:14

## 2021-10-27 RX ADMIN — IBUPROFEN 800 MG: 400 TABLET ORAL at 14:14

## 2021-10-27 RX ADMIN — MAGNESIUM SULFATE HEPTAHYDRATE 2 G/HR: 500 INJECTION, SOLUTION INTRAMUSCULAR; INTRAVENOUS at 02:06

## 2021-10-27 RX ADMIN — Medication 10 ML: at 14:14

## 2021-10-27 RX ADMIN — IBUPROFEN 800 MG: 400 TABLET ORAL at 22:02

## 2021-10-27 RX ADMIN — BUPIVACAINE HYDROCHLORIDE 2 ML: 2.5 INJECTION, SOLUTION EPIDURAL; INFILTRATION; INTRACAUDAL; PERINEURAL at 02:44

## 2021-10-27 RX ADMIN — OXYTOCIN 10000 MILLI-UNITS: 10 INJECTION INTRAVENOUS at 04:02

## 2021-10-27 RX ADMIN — MAGNESIUM SULFATE HEPTAHYDRATE 2 G/HR: 500 INJECTION, SOLUTION INTRAMUSCULAR; INTRAVENOUS at 07:23

## 2021-10-27 RX ADMIN — BUPIVACAINE HYDROCHLORIDE 3 ML: 2.5 INJECTION, SOLUTION EPIDURAL; INFILTRATION; INTRACAUDAL; PERINEURAL at 02:42

## 2021-10-27 RX ADMIN — SODIUM CHLORIDE, POTASSIUM CHLORIDE, SODIUM LACTATE AND CALCIUM CHLORIDE 75 ML/HR: 600; 310; 30; 20 INJECTION, SOLUTION INTRAVENOUS at 12:55

## 2021-10-27 RX ADMIN — INSULIN LISPRO 3 UNITS: 100 INJECTION, SOLUTION INTRAVENOUS; SUBCUTANEOUS at 06:08

## 2021-10-27 RX ADMIN — DIPHENOXYLATE HYDROCHLORIDE AND ATROPINE SULFATE 2 TABLET: 2.5; .025 TABLET ORAL at 04:28

## 2021-10-27 RX ADMIN — LABETALOL HYDROCHLORIDE 20 MG: 5 INJECTION INTRAVENOUS at 01:37

## 2021-10-27 RX ADMIN — IBUPROFEN 800 MG: 400 TABLET ORAL at 06:34

## 2021-10-27 RX ADMIN — MAGNESIUM SULFATE HEPTAHYDRATE 2 G/HR: 500 INJECTION, SOLUTION INTRAMUSCULAR; INTRAVENOUS at 18:28

## 2021-10-27 RX ADMIN — TRANEXAMIC ACID 1000 MG: 1 INJECTION, SOLUTION INTRAVENOUS at 04:18

## 2021-10-27 NOTE — PROGRESS NOTES
Labor Progress Note  Patient seen, fetal heart rate and contraction pattern evaluated. Patient reports mild headache; no visual disturbance, epigastric/RUQ pain. Physical Exam:  Visit Vitals  /66   Pulse 86   Temp 97.6 °F (36.4 °C)   Resp 18   Ht 5' 3\" (1.6 m)   Wt 109.3 kg (241 lb)   SpO2 96%   BMI 42.69 kg/m²     Cervical Exam: 6/80/-2/vertex/mid  Membranes:  ruptured  Uterine Activity: Frequency: 4-5 minutes  Fetal Heart Rate: 120s,  adequate variability and reactivity  Accelerations: yes  Decelerations: occasional variable  Pitocin at 24 miu/min  Date 10/25/21 1900 - 10/26/21 0659 10/26/21 0700 - 10/27/21 0659   Shift 8256-5270 24 Hour Total 8103-6091 9169-3216 24 Hour Total   INTAKE   I.V.(mL/kg/hr) 1752.4 2232 2090.4(1.6) 1424.5 3514.9     Magnesium Sulfate Volume 600 738.3 597.5 250 847.5     Pitocin Volume 2.4 2.4 92.4 179.7 272.1     Volume (lactated Ringers infusion) 900 1141.3 1250.5 944.8 2195.3     Volume (penicillin G potassium (PFIZERPEN) 5 Million Units in 0.9% sodium chloride (MBP/ADV) 100 mL MBP) 100 100        Volume (penicillin G pot (PFIZERPEN) 2.5 Million Units in 50 ml 0.9% NaCl) 150 150 150 50 200     Volume (magnesium sulfate 4 g/100 mL IVPB)  100      Shift Total(mL/kg) 1752.4(16) 6115(38.0) 1734.2(70.5) 1424.5(13) 3514. 9(32.2)   OUTPUT   Urine(mL/kg/hr) 2550 3000 1075(0.8) 1145 2220     Urine Voided 2550 3000 625  625     Urine Occurrence(s)  2 x        Urine Output (mL) (Urinary Catheter 10/26/21 Ramos)   450 1145 1595   Shift Total(mL/kg) 6724(63.6) 8851(99.9) 1075(9.8) 1145(10.5) 2220(20.3)   NET -797.6 -768 1015.4 279.5 1294.9   Weight (kg) 109.3 109.3 109.3 109.3 109.3     Assessment/Plan:  Reassuring fetal status. Making slow progress in labor  Tylenol for HA  Continue pitocin titration  Recheck labs in morning  KIKE Phipps MD

## 2021-10-27 NOTE — PROGRESS NOTES
Labor Progress Note  Patient seen, fetal heart rate and contraction pattern evaluated. Patient reports incrreased pressure with contractions. Physical Exam:  Visit Vitals  BP (!) 150/72   Pulse 89   Temp 97.6 °F (36.4 °C)   Resp 18   Ht 5' 3\" (1.6 m)   Wt 109.3 kg (241 lb)   SpO2 96%   BMI 42.69 kg/m²     Cervical Exam: 8/100/0  Membranes:  ruptured  Uterine Activity: Frequency: 4-5 minutes  Fetal Heart Rate: 120,  adequate variability and reactivity  Accelerations: yes  Decelerations: none  Pitocin at 25 miu/min    Assessment/Plan:  Reassuring fetal status. Patient required another dose labetalol for elevated BP  Progressing adequately  Continue pitocin titration    KIKE William MD

## 2021-10-27 NOTE — PROGRESS NOTES
7:20 PM  Bedside shift change report given to Lynne Escalante RN (oncoming nurse) by David Lino RN (offgoing nurse). Report included the following information SBAR, Kardex, Intake/Output, MAR and Recent Results. 8:18 PM  SVE performed by Dr Helen Larry. Cervix 5/80/-2.    9:00 PM  Lab results reviewed by Dr Shin Christopher. Pt now reporting a slight headache which is a new finding. Dr Shin Christopher updated on this new finding. Verbal order received for PRN Tylenol, 650 mg.     11:08 PM  SVE performed by Dr Shin Christopher. Cervix 6/80/-2.    2:18 AM  SVE performed by Dr Shin Christopher. Cervix 8/100/0.    2:40 AM  Dr Butch Mo in to redose pt's epidural as pt is feeling painful contractions. 3:20 AM  SVE performed by this RN. Pt complete. Dr Shin Christopher informed. Will prep for delivery. 3:27 AM  Pt began pushing. 3:54 AM   of live male infant. 4:25 AM  Pt with heavy bleeding following delivery. Cytotec, 800 mcg given at 0406. Hemabate, 250 mcg given at 0416. TXA, 1g given at 0418. Bleeding now controlled. 6:03 AM  Reported pt's blood glucose of 160 to Dr Shin Christopher. Order received to give 3 units of insulin lispro now.

## 2021-10-27 NOTE — ANESTHESIA POSTPROCEDURE EVALUATION
* No procedures listed *.    epidural    Anesthesia Post Evaluation      Multimodal analgesia: multimodal analgesia used between 6 hours prior to anesthesia start to PACU discharge  Patient location during evaluation: bedside  Patient participation: complete - patient participated  Level of consciousness: awake  Pain management: adequate  Airway patency: patent  Anesthetic complications: no  Cardiovascular status: acceptable  Respiratory status: acceptable  Hydration status: acceptable  Post anesthesia nausea and vomiting:  controlled  Final Post Anesthesia Temperature Assessment:  Normothermia (36.0-37.5 degrees C)      INITIAL Post-op Vital signs:   Vitals Value Taken Time   /82 10/27/21 0530   Temp     Pulse 90 10/27/21 0530   Resp     SpO2 94 % 10/27/21 0532   Vitals shown include unvalidated device data.

## 2021-10-27 NOTE — PROGRESS NOTES
7:15 AM  Bedside shift change report given to Ulisses Ventura RN (oncoming nurse) by Good Pastrana RN (offgoing nurse). Report included the following information SBAR, Kardex, Intake/Output, MAR and Recent Results.

## 2021-10-27 NOTE — PROGRESS NOTES
0715. Bedside shift change report given to BRENDAN Peterson RN (oncoming nurse) by Barrett Giron. Launie Klinefelter, RN (offgoing nurse). Report included the following information SBAR, Kardex, Procedure Summary, Intake/Output, MAR and Recent Results. 1032. Patient up standing, walking in place and up to the chair. 1120. Patient set up with her own breast pump per her request. Mom pumping to take milk to NICU.    1920. Bedside shift change report given to BRENDAN Peterson RN (oncoming nurse) by Barrett Giron. Ellie Peterson RN (offgoing nurse). Report included the following information SBAR, Kardex, Procedure Summary, Intake/Output, MAR and Recent Results.

## 2021-10-27 NOTE — PROGRESS NOTES
7:10 PM  Bedside shift change report given to Antoine Maurer RN (oncoming nurse) by Jimi Shelley RN (offgoing nurse). Report included the following information SBAR, Kardex, Intake/Output, MAR and Recent Results. 3:55 AM  Magnesium discontinued as planned and Ramos catheter pulled. 6:20 AM  Pt up to bathroom with first void since Ramos was pulled. Voided 800 mL.

## 2021-10-27 NOTE — PROGRESS NOTES
Post-Partum Day Number 0 Progress Note    Vickijazmyne Delgado     Assessment: Doing well, post partum day 0    Plan:  - Continue routine postpartum and perineal care as well as maternal education.  - Pre-eclampsia with severe features   Will continue magnesium sulfate until 24 hours post-delivery- 5 am tomorrow-no signs/sxs toxicity   LFTs slightly increased this am >211,  > 167, normal platelets- repeat lab [facility] am   Normal BPs since delivery- continue to monitor closely, good UOP  -Anxiety- well controlled on Zoloft  -PPH treated with cytotec, hemabate and TXA- normal lochia, hgb stable 10.6>9.8  -GDM- diet- 2 hr GTT at [facility] visit  - Plan discharge home PPD 2 if remains stable. Information for the patient's :  Jonas Bis [[id number]]   Vaginal, Spontaneous    Patient doing well without significant complaint. Voiding without difficulty, normal lochia. Denies headache or vision changes, no CP/SOB    Vitals:  Visit Vitals  BP (!) 144/80   Pulse 85   Temp 97.8 °F (36.6 °C)   Resp (P) 16   Ht 5' 3\" (1.6 m)   Wt 109.3 kg (241 lb)   SpO2 98%   Breastfeeding Unknown   BMI 42.69 kg/m²     Temp (24hrs), Av.9 °F (36.6 °C), Min:97.6 °F (36.4 °C), Max:98.6 °F (37 °C)        Exam:   Patient without distress. Fundus firm, nontender per nursing fundal checks. Perineum with normal lochia noted per nursing assessment. Lower extremities are negative for pathological edema.  Normal reflexes B    Labs:     Lab Results   Component Value Date/Time    WBC 16.5 (H) 10/27/2021 04:32 AM    WBC 13.0 (H) 10/26/2021 07:49 PM    WBC 13.3 (H) 10/26/2021 05:03 PM    WBC 11.6 (H) 10/26/2021 04:43 AM    WBC 11.7 (H) 10/25/2021 07:00 PM    WBC 8.8 10/25/2021 12:26 PM    WBC 8.9 09/10/2014 02:35 PM    HGB 9.8 (L) 10/27/2021 04:32 AM    HGB 10.6 (L) 10/26/2021 07:49 PM    HGB 10.6 (L) 10/26/2021 05:03 PM    HGB 11.4 (L) 10/26/2021 04:43 AM    HGB 11.6 10/25/2021 07:00 PM    HGB 11.3 (L) 10/25/2021 12:26 PM    HGB 13.1 09/10/2014 02:35 PM    HCT 29.5 (L) 10/27/2021 04:32 AM    HCT 32.4 (L) 10/26/2021 07:49 PM    HCT 31.1 (L) 10/26/2021 05:03 PM    HCT 34.4 (L) 10/26/2021 04:43 AM    HCT 33.8 (L) 10/25/2021 07:00 PM    HCT 33.4 (L) 10/25/2021 12:26 PM    HCT 39.8 09/10/2014 02:35 PM    PLATELET 590 15/56/3305 04:32 AM    PLATELET 451 93/82/2757 07:49 PM    PLATELET 340 33/47/9051 05:03 PM    PLATELET 993 93/62/1218 04:43 AM    PLATELET 944 82/44/4269 07:00 PM    PLATELET 141 32/19/3745 12:26 PM    PLATELET 369 39/28/1604 02:35 PM       Recent Results (from the past 24 hour(s))   GLUCOSE, POC    Collection Time: 10/26/21  3:56 PM   Result Value Ref Range    Glucose (POC) 165 (H) 65 - 117 mg/dL    Performed by Medina Freitas ([facility])    CBC W/O DIFF    Collection Time: 10/26/21  5:03 PM   Result Value Ref Range    WBC 13.3 (H) 3.6 - 11.0 K/uL    RBC 3.53 (L) 3.80 - 5.20 M/uL    HGB 10.6 (L) 11.5 - 16.0 g/dL    HCT 31.1 (L) 35.0 - 47.0 %    MCV 88.1 80.0 - 99.0 FL    MCH 30.0 26.0 - 34.0 PG    MCHC 34.1 30.0 - 36.5 g/dL    RDW 14.5 11.5 - 14.5 %    PLATELET 186 619 - 308 K/uL    MPV 12.3 8.9 - 12.9 FL    NRBC 0.0 0  WBC    ABSOLUTE NRBC 0.00 0.00 - 6.19 K/uL   METABOLIC PANEL, COMPREHENSIVE    Collection Time: 10/26/21  5:03 PM   Result Value Ref Range    Sodium 134 (L) 136 - 145 mmol/L    Potassium 3.6 3.5 - 5.1 mmol/L    Chloride 106 97 - 108 mmol/L    CO2 19 (L) 21 - 32 mmol/L    Anion gap 9 5 - 15 mmol/L    Glucose 159 (H) 65 - 100 mg/dL    BUN 4 (L) 6 - 20 MG/DL    Creatinine 0.55 0.55 - 1.02 MG/DL    BUN/Creatinine ratio 7 (L) 12 - 20      GFR est AA >60 >60 ml/min/1.73m2    GFR est non-AA >60 >60 ml/min/1.73m2    Calcium 7.9 (L) 8.5 - 10.1 MG/DL    Bilirubin, total 0.4 0.2 - 1.0 MG/DL    ALT (SGPT) 173 (H) 12 - 78 U/L    AST (SGOT) 131 (H) 15 - 37 U/L    Alk.  phosphatase 163 (H) 45 - 117 U/L    Protein, total 6.3 (L) 6.4 - 8.2 g/dL    Albumin 2.5 (L) 3.5 - 5.0 g/dL    Globulin 3.8 2.0 - 4.0 g/dL    A-G Ratio 0.7 (L) 1.1 - 2.2     GLUCOSE, POC    Collection Time: 10/26/21  7:40 PM   Result Value Ref Range    Glucose (POC) 140 (H) 65 - 117 mg/dL    Performed by Antonio Camacho ([facility])    CBC WITH AUTOMATED DIFF    Collection Time: 10/26/21  7:49 PM   Result Value Ref Range    WBC 13.0 (H) 3.6 - 11.0 K/uL    RBC 3.62 (L) 3.80 - 5.20 M/uL    HGB 10.6 (L) 11.5 - 16.0 g/dL    HCT 32.4 (L) 35.0 - 47.0 %    MCV 89.5 80.0 - 99.0 FL    MCH 29.3 26.0 - 34.0 PG    MCHC 32.7 30.0 - 36.5 g/dL    RDW 14.4 11.5 - 14.5 %    PLATELET 322 794 - 081 K/uL    MPV 12.6 8.9 - 12.9 FL    NRBC 0.0 0  WBC    ABSOLUTE NRBC 0.00 0.00 - 0.01 K/uL    NEUTROPHILS 86 (H) 32 - 75 %    LYMPHOCYTES 9 (L) 12 - 49 %    MONOCYTES 3 (L) 5 - 13 %    EOSINOPHILS 0 0 - 7 %    BASOPHILS 0 0 - 1 %    IMMATURE GRANULOCYTES 2 (H) 0.0 - 0.5 %    ABS. NEUTROPHILS 11.3 (H) 1.8 - 8.0 K/UL    ABS. LYMPHOCYTES 1.2 0.8 - 3.5 K/UL    ABS. MONOCYTES 0.3 0.0 - 1.0 K/UL    ABS. EOSINOPHILS 0.0 0.0 - 0.4 K/UL    ABS. BASOPHILS 0.0 0.0 - 0.1 K/UL    ABS. IMM. GRANS. 0.2 (H) 0.00 - 0.04 K/UL    DF AUTOMATED     METABOLIC PANEL, COMPREHENSIVE    Collection Time: 10/26/21  7:49 PM   Result Value Ref Range    Sodium 134 (L) 136 - 145 mmol/L    Potassium 3.8 3.5 - 5.1 mmol/L    Chloride 108 97 - 108 mmol/L    CO2 18 (L) 21 - 32 mmol/L    Anion gap 8 5 - 15 mmol/L    Glucose 153 (H) 65 - 100 mg/dL    BUN 4 (L) 6 - 20 MG/DL    Creatinine 0.49 (L) 0.55 - 1.02 MG/DL    BUN/Creatinine ratio 8 (L) 12 - 20      GFR est AA >60 >60 ml/min/1.73m2    GFR est non-AA >60 >60 ml/min/1.73m2    Calcium 8.1 (L) 8.5 - 10.1 MG/DL    Bilirubin, total 0.4 0.2 - 1.0 MG/DL    ALT (SGPT) 192 (H) 12 - 78 U/L    AST (SGOT) 150 (H) 15 - 37 U/L    Alk.  phosphatase 169 (H) 45 - 117 U/L    Protein, total 6.4 6.4 - 8.2 g/dL    Albumin 2.5 (L) 3.5 - 5.0 g/dL    Globulin 3.9 2.0 - 4.0 g/dL    A-G Ratio 0.6 (L) 1.1 - 2.2     GLUCOSE, POC    Collection Time: 10/26/21 11:52 PM   Result Value Ref Range    Glucose (POC) 145 (H) 65 - 117 mg/dL    Performed by AngelinaFrazrbong Stefanie ([id number] [facility])    GLUCOSE, POC    Collection Time: 10/27/21  2:02 AM   Result Value Ref Range    Glucose (POC) 149 (H) 65 - 117 mg/dL    Performed by [employer] ([id number] [facility])    METABOLIC PANEL, COMPREHENSIVE    Collection Time: 10/27/21  4:32 AM   Result Value Ref Range    Sodium 135 (L) 136 - 145 mmol/L    Potassium 3.6 3.5 - 5.1 mmol/L    Chloride 107 97 - 108 mmol/L    CO2 17 (L) 21 - 32 mmol/L    Anion gap 11 5 - 15 mmol/L    Glucose 171 (H) 65 - 100 mg/dL    BUN 4 (L) 6 - 20 MG/DL    Creatinine 0.66 0.55 - 1.02 MG/DL    BUN/Creatinine ratio 6 (L) 12 - 20      GFR est AA >60 >60 ml/min/1.73m2    GFR est non-AA >60 >60 ml/min/1.73m2    Calcium 7.8 (L) 8.5 - 10.1 MG/DL    Bilirubin, total 0.6 0.2 - 1.0 MG/DL    ALT (SGPT) 211 (H) 12 - 78 U/L    AST (SGOT) 167 (H) 15 - 37 U/L    Alk. phosphatase 148 (H) 45 - 117 U/L    Protein, total 5.5 (L) 6.4 - 8.2 g/dL    Albumin 2.0 (L) 3.5 - 5.0 g/dL    Globulin 3.5 2.0 - 4.0 g/dL    A-G Ratio 0.6 (L) 1.1 - 2.2     CBC WITH AUTOMATED DIFF    Collection Time: 10/27/21  4:32 AM   Result Value Ref Range    WBC 16.5 (H) 3.6 - 11.0 K/uL    RBC 3.31 (L) 3.80 - 5.20 M/uL    HGB 9.8 (L) 11.5 - 16.0 g/dL    HCT 29.5 (L) 35.0 - 47.0 %    MCV 89.1 80.0 - 99.0 FL    MCH 29.6 26.0 - 34.0 PG    MCHC 33.2 30.0 - 36.5 g/dL    RDW 14.6 (H) 11.5 - 14.5 %    PLATELET 928 522 - 708 K/uL    MPV 12.5 8.9 - 12.9 FL    NRBC 0.0 0  WBC    ABSOLUTE NRBC 0.00 0.00 - 0.01 K/uL    NEUTROPHILS 84 (H) 32 - 75 %    LYMPHOCYTES 9 (L) 12 - 49 %    MONOCYTES 5 5 - 13 %    EOSINOPHILS 0 0 - 7 %    BASOPHILS 0 0 - 1 %    IMMATURE GRANULOCYTES 2 (H) 0.0 - 0.5 %    ABS. NEUTROPHILS 13.9 (H) 1.8 - 8.0 K/UL    ABS. LYMPHOCYTES 1.5 0.8 - 3.5 K/UL    ABS. MONOCYTES 0.8 0.0 - 1.0 K/UL    ABS. EOSINOPHILS 0.0 0.0 - 0.4 K/UL    ABS. BASOPHILS 0.0 0.0 - 0.1 K/UL    ABS. IMM.  GRANS. 0.3 (H) 0.00 - 0.04 K/UL    DF AUTOMATED     GLUCOSE, POC    Collection Time: 10/27/21  6:01 AM   Result Value Ref Range    Glucose (POC) 160 (H) 65 - 117 mg/dL    Performed by Corey Dunn ([facility])

## 2021-10-27 NOTE — L&D DELIVERY NOTE
Delivery Summary  Patient: Pam Gallardo             Additional Delivery Comments - Patient was admitted 2 days prior to delivery with PPROM at 34 weeks. On admission, she was closed. ytotec was used overnight, then a CRB was placed. The was expelled the following afternoon, and she was 5 cm. Pitocin augmentation of contractions was initiated and epidural analgesia was placed. Fetal heart tones were Cat 1 and Cat 2 throughout the course of labor. The patient then progressed through the first stage, then progressed through the second stage to a vaginal delivery. When the fetal vertex approached the perinuem with maternal pushing efforts, the patient was prepared for delivery. The baby was delivered without difficulty over intact perineum, bulb suctioned, was noted to have decreased respiratory effort. The cord was clamped and cut, and he was passed to NICU staff in attendance. The placenta delivered spontaneously, intact. The fundus became firm, the cervix and sulci were inspected and appeared to be intact. A right labial  laceration was repaired with 4-0 Vicryl suture. Vaginal exam revealed no evidence of hematoma formation or foreign bodies. There was noted persistent uterine  Atony and associated increased bleeding. The was managed with massage, pitocin, cytotec, hemabate, and TXA. Bleeding became much improved. Sponge and sharps count was correct. The procedure was tolerated adequately by the patient and she is recovering in stable condition. Information for the patient's :  [facility] [[id number]]     Delivery Type: Vaginal, Spontaneous   Delivery Date: 10/27/2021   Delivery Time: 3:54 AM     Birth Weight: 2.25 kg     Sex:  male  Delivery Clinician:  Nanci Conway   Gestational Age: 31w1d    Presentation: Vertex   Position: Right  Occiput  Anterior     Apgars were 6  and 7      Resuscitation Method: C-PAP; Tactile Stimulation;Suctioning-bulb     Meconium Stained: None    Living Status: Living       Placenta Date/Time: 10/27/2021  4:02 AM   Placenta Removal: Spontaneous   Placenta Appearance: Intact    Cord Information: 3 Vessels    Cord Events: None       Disposition of Cord Blood: Discard    Blood Gases Sent?:  Yes   Episiotomy: None   Laceration(s): None     Estimated Blood Loss (ml): No data found    Labor Events  Method: Oxytocin      Augmentation: None   Cervical Ripening: 10/25/2021  4:00 PM  Misoprostol; Ramos/EASI

## 2021-10-28 LAB
ALBUMIN SERPL-MCNC: 2.1 G/DL (ref 3.5–5)
ALBUMIN/GLOB SERPL: 0.6 {RATIO} (ref 1.1–2.2)
ALP SERPL-CCNC: 145 U/L (ref 45–117)
ALT SERPL-CCNC: 210 U/L (ref 12–78)
ANION GAP SERPL CALC-SCNC: 7 MMOL/L (ref 5–15)
AST SERPL-CCNC: 131 U/L (ref 15–37)
BACTERIA SPEC CULT: NORMAL
BILIRUB SERPL-MCNC: 0.3 MG/DL (ref 0.2–1)
BUN SERPL-MCNC: 5 MG/DL (ref 6–20)
BUN/CREAT SERPL: 8 (ref 12–20)
CALCIUM SERPL-MCNC: 7.7 MG/DL (ref 8.5–10.1)
CHLORIDE SERPL-SCNC: 104 MMOL/L (ref 97–108)
CO2 SERPL-SCNC: 23 MMOL/L (ref 21–32)
CREAT SERPL-MCNC: 0.61 MG/DL (ref 0.55–1.02)
ERYTHROCYTE [DISTWIDTH] IN BLOOD BY AUTOMATED COUNT: 14.6 % (ref 11.5–14.5)
GLOBULIN SER CALC-MCNC: 3.5 G/DL (ref 2–4)
GLUCOSE SERPL-MCNC: 108 MG/DL (ref 65–100)
HCT VFR BLD AUTO: 25.5 % (ref 35–47)
HGB BLD-MCNC: 8.5 G/DL (ref 11.5–16)
MCH RBC QN AUTO: 29.8 PG (ref 26–34)
MCHC RBC AUTO-ENTMCNC: 33.3 G/DL (ref 30–36.5)
MCV RBC AUTO: 89.5 FL (ref 80–99)
NRBC # BLD: 0 K/UL (ref 0–0.01)
NRBC BLD-RTO: 0 PER 100 WBC
PLATELET # BLD AUTO: 197 K/UL (ref 150–400)
PMV BLD AUTO: 12.2 FL (ref 8.9–12.9)
POTASSIUM SERPL-SCNC: 4.3 MMOL/L (ref 3.5–5.1)
PROT SERPL-MCNC: 5.6 G/DL (ref 6.4–8.2)
RBC # BLD AUTO: 2.85 M/UL (ref 3.8–5.2)
SERVICE CMNT-IMP: NORMAL
SODIUM SERPL-SCNC: 134 MMOL/L (ref 136–145)
WBC # BLD AUTO: 12.7 K/UL (ref 3.6–11)

## 2021-10-28 PROCEDURE — 65410000002 HC RM PRIVATE OB

## 2021-10-28 PROCEDURE — 74011250636 HC RX REV CODE- 250/636: Performed by: STUDENT IN AN ORGANIZED HEALTH CARE EDUCATION/TRAINING PROGRAM

## 2021-10-28 PROCEDURE — 94640 AIRWAY INHALATION TREATMENT: CPT

## 2021-10-28 PROCEDURE — 85027 COMPLETE CBC AUTOMATED: CPT

## 2021-10-28 PROCEDURE — 74011250637 HC RX REV CODE- 250/637: Performed by: OBSTETRICS & GYNECOLOGY

## 2021-10-28 PROCEDURE — 36415 COLL VENOUS BLD VENIPUNCTURE: CPT

## 2021-10-28 PROCEDURE — 74011000250 HC RX REV CODE- 250: Performed by: OBSTETRICS & GYNECOLOGY

## 2021-10-28 PROCEDURE — 80053 COMPREHEN METABOLIC PANEL: CPT

## 2021-10-28 PROCEDURE — 74011250636 HC RX REV CODE- 250/636: Performed by: OBSTETRICS & GYNECOLOGY

## 2021-10-28 PROCEDURE — 74011250637 HC RX REV CODE- 250/637: Performed by: STUDENT IN AN ORGANIZED HEALTH CARE EDUCATION/TRAINING PROGRAM

## 2021-10-28 RX ORDER — NIFEDIPINE 30 MG/1
60 TABLET, EXTENDED RELEASE ORAL DAILY
Status: DISCONTINUED | OUTPATIENT
Start: 2021-10-28 | End: 2021-10-29

## 2021-10-28 RX ORDER — HYDRALAZINE HYDROCHLORIDE 20 MG/ML
5 INJECTION INTRAMUSCULAR; INTRAVENOUS ONCE
Status: COMPLETED | OUTPATIENT
Start: 2021-10-28 | End: 2021-10-28

## 2021-10-28 RX ORDER — HYDRALAZINE HYDROCHLORIDE 20 MG/ML
10 INJECTION INTRAMUSCULAR; INTRAVENOUS ONCE
Status: ACTIVE | OUTPATIENT
Start: 2021-10-28 | End: 2021-10-29

## 2021-10-28 RX ADMIN — NIFEDIPINE 60 MG: 30 TABLET, EXTENDED RELEASE ORAL at 08:56

## 2021-10-28 RX ADMIN — IBUPROFEN 800 MG: 400 TABLET ORAL at 14:00

## 2021-10-28 RX ADMIN — BUDESONIDE 500 MCG: 0.5 INHALANT RESPIRATORY (INHALATION) at 08:00

## 2021-10-28 RX ADMIN — SODIUM CHLORIDE, POTASSIUM CHLORIDE, SODIUM LACTATE AND CALCIUM CHLORIDE 75 ML/HR: 600; 310; 30; 20 INJECTION, SOLUTION INTRAVENOUS at 01:05

## 2021-10-28 RX ADMIN — IBUPROFEN 800 MG: 400 TABLET ORAL at 06:09

## 2021-10-28 RX ADMIN — IBUPROFEN 800 MG: 400 TABLET ORAL at 22:11

## 2021-10-28 RX ADMIN — SERTRALINE 150 MG: 50 TABLET, FILM COATED ORAL at 20:17

## 2021-10-28 RX ADMIN — HYDRALAZINE HYDROCHLORIDE 10 MG: 20 INJECTION INTRAMUSCULAR; INTRAVENOUS at 14:52

## 2021-10-28 RX ADMIN — ARFORMOTEROL TARTRATE 15 MCG: 15 SOLUTION RESPIRATORY (INHALATION) at 08:00

## 2021-10-28 RX ADMIN — Medication 10 ML: at 04:00

## 2021-10-28 RX ADMIN — HYDRALAZINE HYDROCHLORIDE 5 MG: 20 INJECTION INTRAMUSCULAR; INTRAVENOUS at 14:30

## 2021-10-28 NOTE — ROUTINE PROCESS
Verbal report given to Poornima Rivero RN routine progression of care   Report consisted of patients Situation, Background, Assessment and   Recommendations(SBAR). Information from the following report(s) Kardex, OR Summary, Intake/Output and MAR was reviewed with the receiving nurse. Opportunity for questions and clarification was provided.

## 2021-10-28 NOTE — PROGRESS NOTES
Post-Partum Day Number 1 Progress Note    Alaina Aydin     Assessment: Doing well, post partum day 1    Plan:  - Continue routine postpartum and perineal care as well as maternal education.  - Pre-eclampsia with severe features - magnesium stopped at 24 hours post delivery, mild to severe range pressures this morning (severe ranges not sustained), started Nifedipine 60XR  - LFTs increased: continue to trend    >211>210    > 167>145  -Anxiety- well controlled on Zoloft  -PPH treated with cytotec, hemabate and TXA- normal lochia, hgb stable 10.6>9.8  -GDM- diet- 2 hr GTT at [facility] visit  -Plan discharge home PPD 2 if remains stable    Information for the patient's :  Navi Echeverria [[id number]]   Vaginal, Spontaneous    Patient doing well without significant complaint. Voiding without difficulty, normal lochia. Feeling better now that magnesium is stopped. Ambulating with significant improvement in swelling. Vitals:  Visit Vitals  BP (!) 164/82   Pulse 91   Temp 98.1 °F (36.7 °C)   Resp 18   Ht 5' 3\" (1.6 m)   Wt 109.3 kg (241 lb)   SpO2 100%   Breastfeeding Unknown   BMI 42.69 kg/m²     Temp (24hrs), Av.2 °F (36.8 °C), Min:97.9 °F (36.6 °C), Max:98.7 °F (37.1 °C)        Exam:   Patient without distress. Fundus firm, nontender per nursing fundal checks. Perineum with normal lochia noted per nursing assessment. Lower extremities are negative for pathological edema.     Labs:     Lab Results   Component Value Date/Time    WBC 12.7 (H) 10/28/2021 04:09 AM    WBC 16.4 (H) 10/27/2021 04:05 PM    WBC 16.5 (H) 10/27/2021 04:32 AM    WBC 13.0 (H) 10/26/2021 07:49 PM    WBC 13.3 (H) 10/26/2021 05:03 PM    WBC 11.6 (H) 10/26/2021 04:43 AM    WBC 11.7 (H) 10/25/2021 07:00 PM    WBC 8.8 10/25/2021 12:26 PM    WBC 8.9 09/10/2014 02:35 PM    HGB 8.5 (L) 10/28/2021 04:09 AM    HGB 8.8 (L) 10/27/2021 04:05 PM    HGB 9.8 (L) 10/27/2021 04:32 AM    HGB 10.6 (L) 10/26/2021 07:49 PM    HGB 10.6 (L) 10/26/2021 05:03 PM    HGB 11.4 (L) 10/26/2021 04:43 AM    HGB 11.6 10/25/2021 07:00 PM    HGB 11.3 (L) 10/25/2021 12:26 PM    HGB 13.1 09/10/2014 02:35 PM    HCT 25.5 (L) 10/28/2021 04:09 AM    HCT 26.5 (L) 10/27/2021 04:05 PM    HCT 29.5 (L) 10/27/2021 04:32 AM    HCT 32.4 (L) 10/26/2021 07:49 PM    HCT 31.1 (L) 10/26/2021 05:03 PM    HCT 34.4 (L) 10/26/2021 04:43 AM    HCT 33.8 (L) 10/25/2021 07:00 PM    HCT 33.4 (L) 10/25/2021 12:26 PM    HCT 39.8 09/10/2014 02:35 PM    PLATELET 456 78/19/0040 04:09 AM    PLATELET 197 90/30/0725 04:05 PM    PLATELET 045 23/77/4471 04:32 AM    PLATELET 825 15/62/7145 07:49 PM    PLATELET 088 14/29/5182 05:03 PM    PLATELET 687 95/54/1730 04:43 AM    PLATELET 527 99/60/1272 07:00 PM    PLATELET 091 45/22/7968 12:26 PM    PLATELET 679 81/26/6300 02:35 PM       Recent Results (from the past 24 hour(s))   CBC WITH AUTOMATED DIFF    Collection Time: 10/27/21  4:05 PM   Result Value Ref Range    WBC 16.4 (H) 3.6 - 11.0 K/uL    RBC 3.01 (L) 3.80 - 5.20 M/uL    HGB 8.8 (L) 11.5 - 16.0 g/dL    HCT 26.5 (L) 35.0 - 47.0 %    MCV 88.0 80.0 - 99.0 FL    MCH 29.2 26.0 - 34.0 PG    MCHC 33.2 30.0 - 36.5 g/dL    RDW 14.6 (H) 11.5 - 14.5 %    PLATELET 522 232 - 949 K/uL    MPV 12.1 8.9 - 12.9 FL    NRBC 0.0 0  WBC    ABSOLUTE NRBC 0.00 0.00 - 0.01 K/uL    NEUTROPHILS 75 32 - 75 %    LYMPHOCYTES 17 12 - 49 %    MONOCYTES 6 5 - 13 %    EOSINOPHILS 1 0 - 7 %    BASOPHILS 0 0 - 1 %    IMMATURE GRANULOCYTES 1 (H) 0.0 - 0.5 %    ABS. NEUTROPHILS 12.2 (H) 1.8 - 8.0 K/UL    ABS. LYMPHOCYTES 2.8 0.8 - 3.5 K/UL    ABS. MONOCYTES 1.0 0.0 - 1.0 K/UL    ABS. EOSINOPHILS 0.2 0.0 - 0.4 K/UL    ABS. BASOPHILS 0.0 0.0 - 0.1 K/UL    ABS. IMM.  GRANS. 0.2 (H) 0.00 - 0.04 K/UL    DF AUTOMATED     METABOLIC PANEL, COMPREHENSIVE    Collection Time: 10/27/21  4:05 PM   Result Value Ref Range    Sodium 133 (L) 136 - 145 mmol/L    Potassium 3.8 3.5 - 5.1 mmol/L    Chloride 102 97 - 108 mmol/L    CO2 25 21 - 32 mmol/L    Anion gap 6 5 - 15 mmol/L    Glucose 118 (H) 65 - 100 mg/dL    BUN 4 (L) 6 - 20 MG/DL    Creatinine 0.60 0.55 - 1.02 MG/DL    BUN/Creatinine ratio 7 (L) 12 - 20      GFR est AA >60 >60 ml/min/1.73m2    GFR est non-AA >60 >60 ml/min/1.73m2    Calcium 7.8 (L) 8.5 - 10.1 MG/DL    Bilirubin, total 0.3 0.2 - 1.0 MG/DL    ALT (SGPT) 211 (H) 12 - 78 U/L    AST (SGOT) 143 (H) 15 - 37 U/L    Alk. phosphatase 142 (H) 45 - 117 U/L    Protein, total 5.4 (L) 6.4 - 8.2 g/dL    Albumin 2.1 (L) 3.5 - 5.0 g/dL    Globulin 3.3 2.0 - 4.0 g/dL    A-G Ratio 0.6 (L) 1.1 - 2.2     CBC W/O DIFF    Collection Time: 10/28/21  4:09 AM   Result Value Ref Range    WBC 12.7 (H) 3.6 - 11.0 K/uL    RBC 2.85 (L) 3.80 - 5.20 M/uL    HGB 8.5 (L) 11.5 - 16.0 g/dL    HCT 25.5 (L) 35.0 - 47.0 %    MCV 89.5 80.0 - 99.0 FL    MCH 29.8 26.0 - 34.0 PG    MCHC 33.3 30.0 - 36.5 g/dL    RDW 14.6 (H) 11.5 - 14.5 %    PLATELET 282 264 - 927 K/uL    MPV 12.2 8.9 - 12.9 FL    NRBC 0.0 0  WBC    ABSOLUTE NRBC 0.00 0.00 - 5.10 K/uL   METABOLIC PANEL, COMPREHENSIVE    Collection Time: 10/28/21  4:09 AM   Result Value Ref Range    Sodium 134 (L) 136 - 145 mmol/L    Potassium 4.3 3.5 - 5.1 mmol/L    Chloride 104 97 - 108 mmol/L    CO2 23 21 - 32 mmol/L    Anion gap 7 5 - 15 mmol/L    Glucose 108 (H) 65 - 100 mg/dL    BUN 5 (L) 6 - 20 MG/DL    Creatinine 0.61 0.55 - 1.02 MG/DL    BUN/Creatinine ratio 8 (L) 12 - 20      GFR est AA >60 >60 ml/min/1.73m2    GFR est non-AA >60 >60 ml/min/1.73m2    Calcium 7.7 (L) 8.5 - 10.1 MG/DL    Bilirubin, total 0.3 0.2 - 1.0 MG/DL    ALT (SGPT) 210 (H) 12 - 78 U/L    AST (SGOT) 131 (H) 15 - 37 U/L    Alk.  phosphatase 145 (H) 45 - 117 U/L    Protein, total 5.6 (L) 6.4 - 8.2 g/dL    Albumin 2.1 (L) 3.5 - 5.0 g/dL    Globulin 3.5 2.0 - 4.0 g/dL    A-G Ratio 0.6 (L) 1.1 - 2.2

## 2021-10-28 NOTE — PROGRESS NOTES
Spoke with Dr. Marisa Lazo about patient's blood pressure of 169/97.  Orders received for Procardia XL

## 2021-10-28 NOTE — PROGRESS NOTES
7:20 AM  Bedside shift change report given to Carson Kwan RN (oncoming nurse) by Magda Garcia RN (offgoing nurse). Report included the following information SBAR, Kardex, MAR and Recent Results.

## 2021-10-28 NOTE — ROUTINE PROCESS
1345-Blood pressure 163/84, denies any headaches, blurred vision, or epigastric pain.   Patient is going to use breast pump and then will redo blood pressure at that time    1424-Blood pressure 187/86, Hydrazline 5mg IV given per Dr. Cara Fraire orders    1446-Blood pressure 184/106, 10mg IV Hydrazline given per Dr. Bakari Stratton orders    1523-Blood pressure 141/70, Dr. Cara Fraire notified with no new orders

## 2021-10-29 VITALS
DIASTOLIC BLOOD PRESSURE: 80 MMHG | RESPIRATION RATE: 16 BRPM | WEIGHT: 241 LBS | TEMPERATURE: 98.5 F | BODY MASS INDEX: 42.7 KG/M2 | OXYGEN SATURATION: 100 % | SYSTOLIC BLOOD PRESSURE: 143 MMHG | HEIGHT: 63 IN | HEART RATE: 96 BPM

## 2021-10-29 LAB
ALBUMIN SERPL-MCNC: 2.5 G/DL (ref 3.5–5)
ALBUMIN/GLOB SERPL: 0.7 {RATIO} (ref 1.1–2.2)
ALP SERPL-CCNC: 149 U/L (ref 45–117)
ALT SERPL-CCNC: 216 U/L (ref 12–78)
ANION GAP SERPL CALC-SCNC: 7 MMOL/L (ref 5–15)
AST SERPL-CCNC: 103 U/L (ref 15–37)
BILIRUB SERPL-MCNC: 0.5 MG/DL (ref 0.2–1)
BUN SERPL-MCNC: 7 MG/DL (ref 6–20)
BUN/CREAT SERPL: 13 (ref 12–20)
CALCIUM SERPL-MCNC: 9.5 MG/DL (ref 8.5–10.1)
CHLORIDE SERPL-SCNC: 108 MMOL/L (ref 97–108)
CO2 SERPL-SCNC: 23 MMOL/L (ref 21–32)
CREAT SERPL-MCNC: 0.56 MG/DL (ref 0.55–1.02)
ERYTHROCYTE [DISTWIDTH] IN BLOOD BY AUTOMATED COUNT: 14.6 % (ref 11.5–14.5)
GLOBULIN SER CALC-MCNC: 3.8 G/DL (ref 2–4)
GLUCOSE SERPL-MCNC: 107 MG/DL (ref 65–100)
HCT VFR BLD AUTO: 26.4 % (ref 35–47)
HGB BLD-MCNC: 8.6 G/DL (ref 11.5–16)
MCH RBC QN AUTO: 29.3 PG (ref 26–34)
MCHC RBC AUTO-ENTMCNC: 32.6 G/DL (ref 30–36.5)
MCV RBC AUTO: 89.8 FL (ref 80–99)
NRBC # BLD: 0 K/UL (ref 0–0.01)
NRBC BLD-RTO: 0 PER 100 WBC
PLATELET # BLD AUTO: 196 K/UL (ref 150–400)
PMV BLD AUTO: 12 FL (ref 8.9–12.9)
POTASSIUM SERPL-SCNC: 3.8 MMOL/L (ref 3.5–5.1)
PROT SERPL-MCNC: 6.3 G/DL (ref 6.4–8.2)
RBC # BLD AUTO: 2.94 M/UL (ref 3.8–5.2)
SODIUM SERPL-SCNC: 138 MMOL/L (ref 136–145)
WBC # BLD AUTO: 11.5 K/UL (ref 3.6–11)

## 2021-10-29 PROCEDURE — 74011250637 HC RX REV CODE- 250/637: Performed by: OBSTETRICS & GYNECOLOGY

## 2021-10-29 PROCEDURE — 80053 COMPREHEN METABOLIC PANEL: CPT

## 2021-10-29 PROCEDURE — 90707 MMR VACCINE SC: CPT | Performed by: OBSTETRICS & GYNECOLOGY

## 2021-10-29 PROCEDURE — 36415 COLL VENOUS BLD VENIPUNCTURE: CPT

## 2021-10-29 PROCEDURE — 85027 COMPLETE CBC AUTOMATED: CPT

## 2021-10-29 PROCEDURE — 74011250636 HC RX REV CODE- 250/636: Performed by: OBSTETRICS & GYNECOLOGY

## 2021-10-29 RX ORDER — NIFEDIPINE 30 MG/1
90 TABLET, EXTENDED RELEASE ORAL DAILY
Status: DISCONTINUED | OUTPATIENT
Start: 2021-10-29 | End: 2021-10-29 | Stop reason: HOSPADM

## 2021-10-29 RX ORDER — NIFEDIPINE 90 MG/1
90 TABLET, EXTENDED RELEASE ORAL DAILY
Qty: 90 TABLET | Refills: 0 | Status: SHIPPED | OUTPATIENT
Start: 2021-10-30

## 2021-10-29 RX ORDER — DOCUSATE SODIUM 100 MG/1
100 CAPSULE, LIQUID FILLED ORAL
Qty: 60 CAPSULE | Refills: 0 | Status: SHIPPED | OUTPATIENT
Start: 2021-10-29

## 2021-10-29 RX ORDER — IBUPROFEN 600 MG/1
600 TABLET ORAL
Qty: 60 TABLET | Refills: 0 | Status: SHIPPED | OUTPATIENT
Start: 2021-10-29

## 2021-10-29 RX ADMIN — MEASLES, MUMPS, AND RUBELLA VIRUS VACCINE LIVE 0.5 ML: 1000; 12500; 1000 INJECTION, POWDER, LYOPHILIZED, FOR SUSPENSION SUBCUTANEOUS at 14:27

## 2021-10-29 RX ADMIN — IBUPROFEN 800 MG: 400 TABLET ORAL at 05:17

## 2021-10-29 RX ADMIN — NIFEDIPINE 90 MG: 30 TABLET, EXTENDED RELEASE ORAL at 09:08

## 2021-10-29 RX ADMIN — IBUPROFEN 800 MG: 400 TABLET ORAL at 14:26

## 2021-10-29 NOTE — DISCHARGE SUMMARY
Obstetrical Discharge Summary     Name: Therese Marie MRN: [id number]  SSN: [id number]    YOB: 1990  Age: 28 y.o. Sex: female      Admit Date: 10/25/2021    Discharge Date: 10/29/2021     Admitting Physician: Jahaira Aguirre MD     Attending Physician:  Sachin Gordillo MD     Admission Diagnoses: PROM (premature rupture of membranes) [O42.90]    Discharge Diagnoses:   Information for the patient's :  Nyla Henley [[id number]]   Delivery of a 2.25 kg male infant via Vaginal, Spontaneous on 10/27/2021 at 3:54 AM  by Enoc Frames. Apgars were 6  and 7 . Additional Diagnoses:   Hospital Problems  Never Reviewed        Codes Class Noted POA    PROM (premature rupture of membranes) ICD-10-CM: O42.90  ICD-9-CM: 658.10  10/25/2021 Unknown             Lab Results   Component Value Date/Time    Rubella, External non immune 2021 12:00 AM       Hospital Course: Patient was admitted for  p PPROM at 34 weeks. Developed preeclampsia with severe features and required magnesium for 24 hours post partum. BPs remained mild-severe range, and she was started on Procardia 60XL, which was then titrated to 90XL. At this dose, patient was asymptomatic and BPs were 140s/90s. She had elevated LFTs, but these had stabilized by time of discharge. Cr and platelets were normal.     She was given strict precautions and will follow up in  3 days with an office BP check. She also had A1GDM and will need a 2 hour gtt at 6 weeks. Patient Instructions:   Current Discharge Medication List      START taking these medications    Details   NIFEdipine ER (PROCARDIA XL) 90 mg ER tablet Take 1 Tablet by mouth daily. Qty: 90 Tablet, Refills: 0      ibuprofen (MOTRIN) 600 mg tablet Take 1 Tablet by mouth every six (6) hours as needed for Pain.   Qty: 60 Tablet, Refills: 0      docusate sodium (Colace) 100 mg capsule Take 1 Capsule by mouth two (2) times daily as needed for Constipation for up to 30 doses.  Qty: 60 Capsule, Refills: 0         CONTINUE these medications which have NOT CHANGED    Details   PNV Comb #2-Iron-FA-Omega 3 29-1-400 mg cmpk Take  by mouth. sertraline (Zoloft) 100 mg tablet Take 150 mg by mouth daily. albuterol (PROVENTIL VENTOLIN) 2.5 mg /3 mL (0.083 %) nebu by Nebulization route. STOP taking these medications       loratadine (CLARITIN) 10 mg tablet Comments:   Reason for Stopping:         budesonide-formoterol (SYMBICORT) 160-4.5 mcg/actuation HFAA Comments:   Reason for Stopping:         multivitamin (ONE A DAY) tablet Comments:   Reason for Stopping:         OTHER Comments:   Reason for Stopping:               Disposition at Discharge: Home or self care    Condition at Discharge: Stable    Reference my discharge instructions.     Follow-up Appointments   Procedures    FOLLOW UP VISIT Appointment in: 3 - 5 Days     Standing Status:   Standing     Number of Occurrences:   1     Order Specific Question:   Appointment in     Answer:   3 - 5 Days        Signed By:  Dhruv Krishna MD     October 29, 2021

## 2021-10-29 NOTE — DISCHARGE INSTRUCTIONS
Patient Education        Postpartum: Care Instructions  Overview  After childbirth (postpartum period), your body goes through many changes. Some of these changes happen over several weeks. In the hours after delivery, your body will begin to recover from childbirth while it prepares to breastfeed your . You may feel emotional during this time. Your hormones can shift your mood without warning for no clear reason. In the first couple of weeks after childbirth, it's common to have emotions that change from happy to sad. You may find it hard to sleep. You may cry a lot. This is called the \"baby blues. \" These overwhelming emotions often go away within a couple of days or weeks. But it's important to discuss your feelings with your doctor. It's easy to get too tired and overwhelmed during the first weeks after childbirth. Don't try to do too much. Get rest whenever you can, accept help from others, and eat well and drink plenty of fluids. In the first couple of weeks after you give birth, your doctor or midwife may want to check in with you and make a plan for any follow-up care you may need. You will likely have a complete postpartum visit in the first 3 months after delivery. At that time, your doctor or midwife will check on your recovery from childbirth and see how you're doing with your emotions. You may also discuss your concerns or questions. Follow-up care is a key part of your treatment and safety. Be sure to make and go to all appointments, and call your doctor if you are having problems. It's also a good idea to know your test results and keep a list of the medicines you take. How can you care for yourself at home? · Sleep or rest when your baby sleeps. · Get help with household chores from family or friends, if you can. Don't try to do it all yourself. · If you have hemorrhoids or swelling or pain around the opening of your vagina, try using cold and heat.  You can put ice or a cold pack on the area for 10 to 20 minutes at a time. Put a thin cloth between the ice and your skin. Also try sitting in a few inches of warm water (sitz bath) 3 times a day and after bowel movements. · Take pain medicines exactly as directed. ? If the doctor gave you a prescription medicine for pain, take it as prescribed. ? If you are not taking a prescription pain medicine, ask your doctor if you can take an over-the-counter medicine. · Eat more fiber to avoid constipation. Include foods such as whole-grain breads and cereals, raw vegetables, raw and dried fruits, and beans. · Drink plenty of fluids. If you have kidney, heart, or liver disease and have to limit fluids, talk with your doctor before you increase the amount of fluids you drink. · Do not rinse inside your vagina with fluids (douche). · If you have stitches, keep the area clean by pouring or spraying warm water over the area outside your vagina and anus after you use the toilet. · Keep a list of questions to ask your doctor or midwife. Your questions might be about:  ? Changes in your breasts, such as lumps or soreness. ? When to expect your menstrual period to start again. ? What form of birth control is best for you. ? Weight you have put on during the pregnancy. ? Exercise options. ? What foods and drinks are best for you, especially if you are breastfeeding. ? Problems you might be having with breastfeeding. ? When you can have sex. You may want to talk about lubricants for your vagina. ? Any feelings of sadness or restlessness that you are having. When should you call for help? Call 911  anytime you think you may need emergency care. For example, call if:    · You have thoughts of harming yourself, your baby, or another person.     · You passed out (lost consciousness).     · You have chest pain, are short of breath, or cough up blood.     · You have a seizure.    Call your doctor now or seek immediate medical care if:    · Your vaginal bleeding seems to be getting heavier.     · You are dizzy or lightheaded, or you feel like you may faint.     · You have a fever.     · You have new or more belly pain.     · You have symptoms of a blood clot in your leg (called a deep vein thrombosis), such as:  ? Pain in the calf, back of the knee, thigh, or groin. ? Redness and swelling in your leg or groin.     · You have signs of preeclampsia, such as:  ? Sudden swelling of your face, hands, or feet. ? New vision problems (such as dimness, blurring, or seeing spots). ? A severe headache. Watch closely for changes in your health, and be sure to contact your doctor if:    · You have new or worse vaginal discharge.     · You feel sad or depressed.     · You are having problems with your breasts or breastfeeding. Where can you learn more? Go to http://www.gray.com/  Enter Y210 in the search box to learn more about \"Postpartum: Care Instructions. \"  Current as of: June 16, 2021               Content Version: 13.0  © 1747-1563 [employer]. Care instructions adapted under license by [employer] (which disclaims liability or warranty for this information). If you have questions about a medical condition or this instruction, always ask your healthcare professional. [address] any warranty or liability for your use of this information.

## 2021-10-29 NOTE — PROGRESS NOTES
Post-Partum Day Number 2 Progress Note    [place] [facility]     Assessment: Doing well, post partum day 2 from  p PPROM at 34 weeks. Developed preeclampsia with severe features and is s/p Mg x 24 hours. Started on Nifedipine 60XL yesterday and increased to 90 XL this am based on yesterday's BPs. Today, patient is asymptomatic and BPs are mild range. Plts, Cr are normal.  LFTs have stabilized. Offered ongoing admission for BP monitoring, however she strongly desires discharge home with close follow-up. She has a cuff at home and is aware of symptoms. Patient also had A1GDM. Plan:   - Discharge home today  - Follow up in office in 3 days week(s) with [facility]. She will also need a 6 week 2 hour gtt. - Rx sent for Nifedipine 90XL. - Questions answered. Information for the patient's :  Lynette Bhatia [[id number]]   Vaginal, Spontaneous    Patient doing well without significant complaint. Voiding without difficulty, normal lochia. Denies HA, vision changes, RUQ pain. Ready for discharge home. Vitals:  Visit Vitals  BP (!) 143/80 (BP 1 Location: Left upper arm, BP Patient Position: Sitting)   Pulse 96   Temp 98.5 °F (36.9 °C)   Resp 16   Ht 5' 3\" (1.6 m)   Wt 109.3 kg (241 lb)   SpO2 100%   Breastfeeding Unknown   BMI 42.69 kg/m²     Temp (24hrs), Av.5 °F (36.9 °C), Min:98.2 °F (36.8 °C), Max:98.8 °F (37.1 °C)      Exam:        Patient without distress.                 Fundus firm, nontender per nursing fundal checks                Perineum with normal lochia noted per nursing assessment                Lower extremities are negative for pathological edema    Labs:       Recent Results (from the past 24 hour(s))   CBC W/O DIFF    Collection Time: 10/29/21  5:31 AM   Result Value Ref Range    WBC 11.5 (H) 3.6 - 11.0 K/uL    RBC 2.94 (L) 3.80 - 5.20 M/uL    HGB 8.6 (L) 11.5 - 16.0 g/dL    HCT 26.4 (L) 35.0 - 47.0 %    MCV 89.8 80.0 - 99.0 FL    MCH 29.3 26.0 - 34.0 PG MCHC 32.6 30.0 - 36.5 g/dL    RDW 14.6 (H) 11.5 - 14.5 %    PLATELET 277 706 - 265 K/uL    MPV 12.0 8.9 - 12.9 FL    NRBC 0.0 0  WBC    ABSOLUTE NRBC 0.00 0.00 - 5.70 K/uL   METABOLIC PANEL, COMPREHENSIVE    Collection Time: 10/29/21  5:31 AM   Result Value Ref Range    Sodium 138 136 - 145 mmol/L    Potassium 3.8 3.5 - 5.1 mmol/L    Chloride 108 97 - 108 mmol/L    CO2 23 21 - 32 mmol/L    Anion gap 7 5 - 15 mmol/L    Glucose 107 (H) 65 - 100 mg/dL    BUN 7 6 - 20 MG/DL    Creatinine 0.56 0.55 - 1.02 MG/DL    BUN/Creatinine ratio 13 12 - 20      GFR est AA >60 >60 ml/min/1.73m2    GFR est non-AA >60 >60 ml/min/1.73m2    Calcium 9.5 8.5 - 10.1 MG/DL    Bilirubin, total 0.5 0.2 - 1.0 MG/DL    ALT (SGPT) 216 (H) 12 - 78 U/L    AST (SGOT) 103 (H) 15 - 37 U/L    Alk.  phosphatase 149 (H) 45 - 117 U/L    Protein, total 6.3 (L) 6.4 - 8.2 g/dL    Albumin 2.5 (L) 3.5 - 5.0 g/dL    Globulin 3.8 2.0 - 4.0 g/dL    A-G Ratio 0.7 (L) 1.1 - 2.2

## 2021-10-29 NOTE — PROGRESS NOTES
1515-  DC instructions, rx, amd follow up appt. Reviewed with pt. Pt states understanding and all questions answered. Pt dc at this time.

## 2022-03-18 PROBLEM — O42.90 PROM (PREMATURE RUPTURE OF MEMBRANES): Status: ACTIVE | Noted: 2021-10-25

## 2023-02-25 ENCOUNTER — HOSPITAL ENCOUNTER (EMERGENCY)
Age: 33
Discharge: HOME | End: 2023-02-25
Attending: STUDENT IN AN ORGANIZED HEALTH CARE EDUCATION/TRAINING PROGRAM
Payer: COMMERCIAL

## 2023-02-25 ENCOUNTER — APPOINTMENT (OUTPATIENT)
Dept: GENERAL RADIOLOGY | Age: 33
End: 2023-02-25
Attending: STUDENT IN AN ORGANIZED HEALTH CARE EDUCATION/TRAINING PROGRAM
Payer: COMMERCIAL

## 2023-02-25 VITALS
HEART RATE: 86 BPM | RESPIRATION RATE: 16 BRPM | SYSTOLIC BLOOD PRESSURE: 149 MMHG | DIASTOLIC BLOOD PRESSURE: 110 MMHG | OXYGEN SATURATION: 98 % | WEIGHT: 248.46 LBS | HEIGHT: 63 IN | BODY MASS INDEX: 44.02 KG/M2 | TEMPERATURE: 98.7 F

## 2023-02-25 DIAGNOSIS — S49.92XA INJURY OF LEFT SHOULDER, INITIAL ENCOUNTER: Primary | ICD-10-CM

## 2023-02-25 PROCEDURE — 74011250637 HC RX REV CODE- 250/637: Performed by: STUDENT IN AN ORGANIZED HEALTH CARE EDUCATION/TRAINING PROGRAM

## 2023-02-25 PROCEDURE — 73030 X-RAY EXAM OF SHOULDER: CPT

## 2023-02-25 PROCEDURE — 99283 EMERGENCY DEPT VISIT LOW MDM: CPT

## 2023-02-25 RX ORDER — ESCITALOPRAM OXALATE 5 MG/1
5 TABLET ORAL DAILY
COMMUNITY

## 2023-02-25 RX ORDER — HYDROCODONE BITARTRATE AND ACETAMINOPHEN 5; 325 MG/1; MG/1
1 TABLET ORAL
Status: COMPLETED | OUTPATIENT
Start: 2023-02-25 | End: 2023-02-25

## 2023-02-25 RX ADMIN — HYDROCODONE BITARTRATE AND ACETAMINOPHEN 1 TABLET: 5; 325 TABLET ORAL at 15:40

## 2023-02-25 NOTE — ED TRIAGE NOTES
Patient arrives with c/o left shoulder pain for about 30 minutes after falling at a trampoline park and landing on left side and possibly \"popping it out of socket\". Someone at the facility that works there that witnessed the injury attempted to put in back in place. Patient still reporting soreness. Took 800mg ibuprofen PTA. Denies numbness, tingling to left arm.

## 2023-02-25 NOTE — ED NOTES
I have reviewed discharge instructions with the patient. The patient verbalized understanding. Copy of discharge instructions provided. Patient ambulated out of ED with a steady gait.

## 2023-02-25 NOTE — DISCHARGE INSTRUCTIONS
RETURN IF WORSENING PAIN, CHANGE IN COLOR, CHANGE IN TEMPERATURE, OR LOSS OF SENSATION IN THE AFFECTED EXTREMITY    Ice the shoulder frequently--15 minutes at a time 4-5 times per day

## 2023-02-25 NOTE — ED PROVIDER NOTES
Patient is a 28-year-old female presenting today with left shoulder injury. Patient reports that she was at the [employer] park when she fell onto her left shoulder. She felt it pop out of socket and then a nursing student went up to her shoulder to look at it and when she touched the shoulder pop back in the socket. She has had issues with the shoulder in the past after a fall down steps and is currently in physical therapy for it. Right now she has pain only with movement of the shoulder. No numbness or tingling in the fingers. No other injury at this time. Took ibuprofen earlier today. Past Medical History:   Diagnosis Date    Asthma     COVID 05/10/2021    Gestational diabetes     Gestational hypertension     Psychiatric problem     anxiety       History reviewed. No pertinent surgical history. History reviewed. No pertinent family history.     Social History     Socioeconomic History    Marital status:      Spouse name: Not on file    Number of children: Not on file    Years of education: Not on file    Highest education level: Not on file   Occupational History    Not on file   Tobacco Use    Smoking status: Never     Passive exposure: Never    Smokeless tobacco: Not on file   Vaping Use    Vaping Use: Not on file   Substance and Sexual Activity    Alcohol use: No    Drug use: Yes     Types: Marijuana    Sexual activity: Not on file   Other Topics Concern     Service Not Asked    Blood Transfusions Not Asked    Caffeine Concern Not Asked    Occupational Exposure Not Asked    Hobby Hazards Not Asked    Sleep Concern Not Asked    Stress Concern Not Asked    Weight Concern Not Asked    Special Diet Not Asked    Back Care Not Asked    Exercise Not Asked    Bike Helmet Not Asked    Seat Belt Not Asked    Self-Exams Not Asked   Social History Narrative    Not on file     Social Determinants of Health     Financial Resource Strain: Not on file   Food Insecurity: Not on file Transportation Needs: Not on file   Physical Activity: Not on file   Stress: Not on file   Social Connections: Not on file   Intimate Partner Violence: Not on file   Housing Stability: Not on file         ALLERGIES: Patient has no known allergies. Review of Systems   Musculoskeletal:  Positive for arthralgias. Neurological:  Negative for weakness and numbness. All other systems reviewed and are negative. Vitals:    02/25/23 1443   BP: (!) 149/110   Pulse: 86   Resp: 16   Temp: 98.7 °F (37.1 °C)   SpO2: 98%   Weight: 112.7 kg (248 lb 7.3 oz)   Height: 5' 3\" (1.6 m)            Physical Exam  Constitutional:       General: She is not in acute distress. Appearance: She is well-developed. HENT:      Head: Normocephalic. Eyes:      Conjunctiva/sclera: Conjunctivae normal.   Pulmonary:      Effort: Pulmonary effort is normal. No respiratory distress. Musculoskeletal:      Cervical back: Normal range of motion. Comments: Left upper extremity: There is limited range of motion of the left shoulder due to pain. There is lateral tenderness and anterior tenderness without scapular tenderness. Palpable radial pulse. Motor and sensory intact in the hand. No obvious deformity. No skin tenting. Skin:     General: Skin is warm and dry. Capillary Refill: Capillary refill takes less than 2 seconds. Psychiatric:         Behavior: Behavior normal.        Medical Decision Making  Work-up:  X-ray of the left shoulder shows no acute process    Norco given for pain  Shoulder immobilizer placed    The patient is a 52-year-old female presenting today with a left shoulder injury. Neurovascular intact. History is suggestive of a transient shoulder dislocation that reduced prior to arrival.  Will treat with shoulder immobilization. Norco given for pain here. Advised continued Tylenol and ibuprofen at home. To follow-up with orthopedics for further evaluation and likely MRI in the outpatient setting. She is to pause her current physical therapy regimen until cleared by orthopedics. Problems Addressed:  Injury of left shoulder, initial encounter: acute illness or injury    Amount and/or Complexity of Data Reviewed  Radiology: ordered. Risk  Prescription drug management.            Procedures

## 2023-03-01 ENCOUNTER — TRANSCRIBE ORDER (OUTPATIENT)
Dept: SCHEDULING | Age: 33
End: 2023-03-01

## 2023-03-01 DIAGNOSIS — S43.432A TEAR OF LEFT GLENOID LABRUM, INITIAL ENCOUNTER: Primary | ICD-10-CM

## 2023-04-03 ENCOUNTER — HOSPITAL ENCOUNTER (OUTPATIENT)
Dept: MRI IMAGING | Age: 33
End: 2023-04-03
Attending: ORTHOPAEDIC SURGERY
Payer: COMMERCIAL

## 2023-04-03 ENCOUNTER — HOSPITAL ENCOUNTER (OUTPATIENT)
Dept: GENERAL RADIOLOGY | Age: 33
End: 2023-04-03
Attending: ORTHOPAEDIC SURGERY
Payer: COMMERCIAL

## 2023-04-03 DIAGNOSIS — S43.432A TEAR OF LEFT GLENOID LABRUM, INITIAL ENCOUNTER: ICD-10-CM

## 2023-04-03 PROCEDURE — 73222 MRI JOINT UPR EXTREM W/DYE: CPT

## 2023-04-03 PROCEDURE — 74011000636 HC RX REV CODE- 636: Performed by: RADIOLOGY

## 2023-04-03 PROCEDURE — A9576 INJ PROHANCE MULTIPACK: HCPCS | Performed by: RADIOLOGY

## 2023-04-03 PROCEDURE — 23350 INJECTION FOR SHOULDER X-RAY: CPT

## 2023-04-03 PROCEDURE — 74011250636 HC RX REV CODE- 250/636: Performed by: RADIOLOGY

## 2023-04-03 RX ADMIN — GADOTERIDOL 2 ML: 279.3 INJECTION, SOLUTION INTRAVENOUS at 11:51

## 2023-04-03 RX ADMIN — IOHEXOL 10 ML: 300 INJECTION, SOLUTION INTRAVENOUS at 11:51

## 2023-05-20 RX ORDER — SERTRALINE HYDROCHLORIDE 100 MG/1
150 TABLET, FILM COATED ORAL DAILY
COMMUNITY

## 2023-05-20 RX ORDER — NIFEDIPINE 90 MG/1
90 TABLET, EXTENDED RELEASE ORAL DAILY
COMMUNITY
Start: 2021-10-30

## 2023-05-20 RX ORDER — ALBUTEROL SULFATE 2.5 MG/3ML
SOLUTION RESPIRATORY (INHALATION)
COMMUNITY

## 2023-05-20 RX ORDER — ESCITALOPRAM OXALATE 5 MG/1
5 TABLET ORAL DAILY
COMMUNITY

## 2023-05-20 RX ORDER — IBUPROFEN 600 MG/1
600 TABLET ORAL EVERY 6 HOURS PRN
COMMUNITY
Start: 2021-10-29

## 2023-05-20 RX ORDER — PSEUDOEPHEDRINE HCL 30 MG
100 TABLET ORAL 2 TIMES DAILY PRN
COMMUNITY
Start: 2021-10-29